# Patient Record
Sex: FEMALE | Race: BLACK OR AFRICAN AMERICAN | NOT HISPANIC OR LATINO | Employment: FULL TIME | URBAN - METROPOLITAN AREA
[De-identification: names, ages, dates, MRNs, and addresses within clinical notes are randomized per-mention and may not be internally consistent; named-entity substitution may affect disease eponyms.]

---

## 2017-01-03 ENCOUNTER — OFFICE VISIT (OUTPATIENT)
Dept: INTERNAL MEDICINE | Facility: CLINIC | Age: 27
End: 2017-01-03
Payer: COMMERCIAL

## 2017-01-03 VITALS
BODY MASS INDEX: 30.01 KG/M2 | HEIGHT: 62 IN | SYSTOLIC BLOOD PRESSURE: 110 MMHG | WEIGHT: 163.1 LBS | DIASTOLIC BLOOD PRESSURE: 66 MMHG | HEART RATE: 70 BPM | OXYGEN SATURATION: 98 % | TEMPERATURE: 97.9 F

## 2017-01-03 DIAGNOSIS — R22.0 FACIAL SWELLING: ICD-10-CM

## 2017-01-03 DIAGNOSIS — K59.00 CONSTIPATION, UNSPECIFIED CONSTIPATION TYPE: ICD-10-CM

## 2017-01-03 DIAGNOSIS — L65.9 HAIR LOSS: ICD-10-CM

## 2017-01-03 DIAGNOSIS — R53.83 FATIGUE, UNSPECIFIED TYPE: ICD-10-CM

## 2017-01-03 DIAGNOSIS — R68.89 COLD INTOLERANCE: ICD-10-CM

## 2017-01-03 DIAGNOSIS — R63.5 WEIGHT GAIN: ICD-10-CM

## 2017-01-03 LAB
ERYTHROCYTE [DISTWIDTH] IN BLOOD BY AUTOMATED COUNT: 15.2 % (ref 10–15)
FERRITIN SERPL-MCNC: 28 NG/ML (ref 12–150)
HCT VFR BLD AUTO: 35.6 % (ref 35–47)
HGB BLD-MCNC: 11.4 G/DL (ref 11.7–15.7)
IRON SATN MFR SERPL: 16 % (ref 15–46)
IRON SERPL-MCNC: 58 UG/DL (ref 35–180)
MCH RBC QN AUTO: 21.7 PG (ref 26.5–33)
MCHC RBC AUTO-ENTMCNC: 32 G/DL (ref 31.5–36.5)
MCV RBC AUTO: 68 FL (ref 78–100)
PLATELET # BLD AUTO: 237 10E9/L (ref 150–450)
RBC # BLD AUTO: 5.26 10E12/L (ref 3.8–5.2)
TIBC SERPL-MCNC: 361 UG/DL (ref 240–430)
TSH SERPL DL<=0.005 MIU/L-ACNC: 0.73 MU/L (ref 0.4–4)
WBC # BLD AUTO: 4.2 10E9/L (ref 4–11)

## 2017-01-03 PROCEDURE — 82306 VITAMIN D 25 HYDROXY: CPT | Mod: 90 | Performed by: INTERNAL MEDICINE

## 2017-01-03 PROCEDURE — 84443 ASSAY THYROID STIM HORMONE: CPT | Performed by: INTERNAL MEDICINE

## 2017-01-03 PROCEDURE — 99214 OFFICE O/P EST MOD 30 MIN: CPT | Performed by: INTERNAL MEDICINE

## 2017-01-03 PROCEDURE — 82728 ASSAY OF FERRITIN: CPT | Performed by: INTERNAL MEDICINE

## 2017-01-03 PROCEDURE — 83540 ASSAY OF IRON: CPT | Performed by: INTERNAL MEDICINE

## 2017-01-03 PROCEDURE — 85027 COMPLETE CBC AUTOMATED: CPT | Performed by: INTERNAL MEDICINE

## 2017-01-03 PROCEDURE — 36415 COLL VENOUS BLD VENIPUNCTURE: CPT | Performed by: INTERNAL MEDICINE

## 2017-01-03 PROCEDURE — 99000 SPECIMEN HANDLING OFFICE-LAB: CPT | Performed by: INTERNAL MEDICINE

## 2017-01-03 PROCEDURE — 83550 IRON BINDING TEST: CPT | Performed by: INTERNAL MEDICINE

## 2017-01-03 NOTE — PATIENT INSTRUCTIONS
Labs - please proceed to our first floor laboratory to have these drawn (show them your orange ticket).     Results:    If normal: we will release results in MyChart or send them in the mail. You will not be called for these results.    If abnormal, but non-urgent: we will release results in MyChart or send them in the mail. You will not be called for these results.    If abnormal and urgent: we will call you.

## 2017-01-03 NOTE — NURSING NOTE
"Chief Complaint   Patient presents with     Other     X 8-9 months. Hair loss, feeling puffy, weight gain, feeling cold and would like to get check for Hypothyroidism.     Anemia     Has been told to be anemic in the past.       Initial /66 mmHg  Pulse 70  Temp(Src) 97.9  F (36.6  C) (Oral)  Ht 5' 2\" (1.575 m)  Wt 163 lb 1.6 oz (73.982 kg)  BMI 29.82 kg/m2  SpO2 98%  LMP 12/29/2016 (Exact Date)  Breastfeeding? No Estimated body mass index is 29.82 kg/(m^2) as calculated from the following:    Height as of this encounter: 5' 2\" (1.575 m).    Weight as of this encounter: 163 lb 1.6 oz (73.982 kg).  BP completed using cuff size: regular    Kaminibose MA      "

## 2017-01-03 NOTE — PROGRESS NOTES
"  SUBJECTIVE:                                                      HPI: Gutierrez Mar is a pleasant 26 year old female who presents concern for thyroid disease:    - has had multiple symptoms ongoing for last year  - symptoms include:   - hair is thinning out - no receding hairline, widening part, or bald spots   - face is puffy   - much more fatigued than usual   - gets really cold often   - has gained 15-20lbs in last year   - extremely constipated - BM every 5 days - hard, small, and difficulty to evacuate   - both more anxious and feeling down than usual    - is very busy - nursing student  - only eats two larger than normal meals/day - admits that they are not the healthiest  - does not have time to exercise    Recently treated for breast implant infection.  Has been told that she was anemic in the past. Periods are generally regular (within week of expected date). Not on an iron supplement.    Otherwise, no active medical problems.   No personal or FH of thyroid disease.     The medication, allergy, and problem lists have been reviewed and updated as appropriate.     OBJECTIVE:                                                      /66 mmHg  Pulse 70  Temp(Src) 97.9  F (36.6  C) (Oral)  Ht 5' 2\" (1.575 m)  Wt 163 lb 1.6 oz (73.982 kg)  BMI 29.82 kg/m2  SpO2 98%  LMP 12/29/2016 (Exact Date)  Breastfeeding? No  Constitutional: well-appearing  Neck: supple, symmetric, no thyromegaly or lymphadenopathy  Respiratory: normal respiratory effort; clear to auscultation bilaterally  Cardiovascular: regular rate and rhythm; no edema  Gastrointestinal: soft, mildly tender throughout, non-distended, normal bowel sounds  Hair: no receding hairline or widening parts; negative pull test    ASSESSMENT/PLAN:                                                      (R53.83) Fatigue, unspecified type  Plan:    - will check TSH reflex, CBC, and iron studies.   - if above unrevealing, likely due to busy schedule, poor " diet, and lack of exercise.    (L65.9) Hair loss  Plan:    - will check TSH reflex, CBC, iron studies, and vitamin D level.    - if labs unrevealing, may benefit from seeing a dermatologist.     (R22.0) Facial swelling  Plan:    - will check TSH reflex (hypothyroidism --> weight gain --> facial puffiness).   - if negative, suspect that poor diet with likely high sodium content may be cause/contributing.     (K59.00) Constipation, unspecified constipation type  Plan:    - will check TSH reflex.   - if negative, likely due to poor diet.     (R63.5) Weight gain  Plan:    - will check TSH reflex.   - if negative, likely due to poor diet and lack of exercise.     (R68.89) Cold intolerance  Plan:    - will check TSH reflex, CBC, and iron studies.   - if negative, likely due to fatigue.     The instructions on the AVS were discussed and explained to the patient. Patient expressed understanding of instructions.  A total of 25 minutes were spent face-to-face with this patient during this encounter and over half of that time was spent on counseling and coordination of care re: above diagnoses and plans of care.     Bina Hunter MD   [address] [address]  T: [phone], F: [phone]              .

## 2017-01-03 NOTE — MR AVS SNAPSHOT
"              After Visit Summary   1/3/2017    Gutierrez Mar    MRN: [id number]           Patient Information     Date Of Birth          1990        Visit Information        Provider Department      1/3/2017 2:00 PM Bina Hunter MD [facility]        Today's Diagnoses     History of anemia    -  1     Screening for thyroid disorder         Fatigue, unspecified type         Hair loss           Care Instructions    Labs - please proceed to our first floor laboratory to have these drawn (show them your orange ticket).     Results:    If normal: we will release results in [employer] or send them in the mail. You will not be called for these results.    If abnormal, but non-urgent: we will release results in MyChart or send them in the mail. You will not be called for these results.    If abnormal and urgent: we will call you.                Follow-ups after your visit        Who to contact     If you have questions or need follow up information about today's clinic visit or your schedule please contact [facility] directly at [phone].  Normal or non-critical lab and imaging results will be communicated to you by MyChart, letter or phone within 4 business days after the clinic has received the results. If you do not hear from us within 7 days, please contact the clinic through [employer] or phone. If you have a critical or abnormal lab result, we will notify you by phone as soon as possible.  Submit refill requests through [employer] or call your pharmacy and they will forward the refill request to us. Please allow 3 business days for your refill to be completed.          Additional Information About Your Visit        [employer] Information     [employer] lets you send messages to your doctor, view your test results, renew your prescriptions, schedule appointments and more. To sign up, go to [email or web address] . Click on \"Log in\" on the left side of the screen, " "which will take you to the Welcome page. Then click on \"Sign up Now\" on the right side of the page.     You will be asked to enter the access code listed below, as well as some personal information. Please follow the directions to create your username and password.     Your access code is: [email or web address]  Expires: 4/3/2017  2:19 PM     Your access code will  in 90 days. If you need help or a new code, please call your [facility] or [phone].        Care EveryWhere ID     This is your Care EveryWhere ID. This could be used by other organizations to access your [facility] medical records  [id number]        Your Vitals Were     Pulse Temperature Height    70 97.9  F (36.6  C) (Oral) 5' 2\" (1.575 m)    BMI (Body Mass Index) Pulse Oximetry Last Period    29.82 kg/m2 98% 2016 (Exact Date)    Breastfeeding?          No         Blood Pressure from Last 3 Encounters:   17 110/66   16 108/70   16 117/66    Weight from Last 3 Encounters:   17 163 lb 1.6 oz (73.982 kg)   16 158 lb 4.8 oz (71.804 kg)   16 172 lb (78.019 kg)              We Performed the Following     CBC with platelets     Ferritin     Iron and iron binding capacity     TSH with free T4 reflex     Vitamin D deficiency screening        Primary Care Provider Office Phone # Fax #    [facility] [phone] [address] [facility] [address]        Thank you!     Thank you for choosing [facility]  for your care. Our goal is always to provide you with excellent care. Hearing back from our patients is one way we can continue to improve our services. Please take a few minutes to complete the written survey that you may receive in the mail after your visit with us. Thank you!             Your Updated Medication List - Protect others around you: Learn how to safely use, store and throw away your medicines at www.disposemymeds.org.    "   Notice  As of 1/3/2017  2:19 PM    You have not been prescribed any medications.

## 2017-01-04 LAB — DEPRECATED CALCIDIOL+CALCIFEROL SERPL-MC: 21 UG/L (ref 20–75)

## 2017-04-11 ENCOUNTER — OFFICE VISIT (OUTPATIENT)
Dept: INTERNAL MEDICINE | Facility: CLINIC | Age: 27
End: 2017-04-11
Payer: COMMERCIAL

## 2017-04-11 VITALS
TEMPERATURE: 98.2 F | SYSTOLIC BLOOD PRESSURE: 108 MMHG | OXYGEN SATURATION: 100 % | HEART RATE: 66 BPM | BODY MASS INDEX: 29.71 KG/M2 | WEIGHT: 167.7 LBS | HEIGHT: 63 IN | DIASTOLIC BLOOD PRESSURE: 68 MMHG

## 2017-04-11 DIAGNOSIS — E88.810 METABOLIC SYNDROME X: ICD-10-CM

## 2017-04-11 DIAGNOSIS — R79.1 PT PROLONGED: ICD-10-CM

## 2017-04-11 DIAGNOSIS — Z13.6 CARDIOVASCULAR SCREENING; LDL GOAL LESS THAN 160: ICD-10-CM

## 2017-04-11 DIAGNOSIS — R53.83 FATIGUE, UNSPECIFIED TYPE: ICD-10-CM

## 2017-04-11 DIAGNOSIS — E63.9 POOR DIET: ICD-10-CM

## 2017-04-11 DIAGNOSIS — R07.89 CHEST WALL PAIN: Primary | ICD-10-CM

## 2017-04-11 DIAGNOSIS — D57.3 SICKLE CELL TRAIT (H): ICD-10-CM

## 2017-04-11 DIAGNOSIS — D64.9 ANEMIA, UNSPECIFIED TYPE: ICD-10-CM

## 2017-04-11 DIAGNOSIS — E55.9 VITAMIN D DEFICIENCY: ICD-10-CM

## 2017-04-11 LAB
ERYTHROCYTE [DISTWIDTH] IN BLOOD BY AUTOMATED COUNT: 15.1 % (ref 10–15)
HCT VFR BLD AUTO: 36.2 % (ref 35–47)
HGB BLD-MCNC: 11.6 G/DL (ref 11.7–15.7)
INR PPP: 1.04 (ref 0.86–1.14)
MCH RBC QN AUTO: 22.1 PG (ref 26.5–33)
MCHC RBC AUTO-ENTMCNC: 32 G/DL (ref 31.5–36.5)
MCV RBC AUTO: 69 FL (ref 78–100)
PLATELET # BLD AUTO: 251 10E9/L (ref 150–450)
RBC # BLD AUTO: 5.24 10E12/L (ref 3.8–5.2)
WBC # BLD AUTO: 4.8 10E9/L (ref 4–11)

## 2017-04-11 PROCEDURE — 84207 ASSAY OF VITAMIN B-6: CPT | Mod: 90 | Performed by: INTERNAL MEDICINE

## 2017-04-11 PROCEDURE — 99215 OFFICE O/P EST HI 40 MIN: CPT | Performed by: INTERNAL MEDICINE

## 2017-04-11 PROCEDURE — 83550 IRON BINDING TEST: CPT | Performed by: INTERNAL MEDICINE

## 2017-04-11 PROCEDURE — 84425 ASSAY OF VITAMIN B-1: CPT | Mod: 90 | Performed by: INTERNAL MEDICINE

## 2017-04-11 PROCEDURE — 82180 ASSAY OF ASCORBIC ACID: CPT | Mod: 90 | Performed by: INTERNAL MEDICINE

## 2017-04-11 PROCEDURE — 83540 ASSAY OF IRON: CPT | Performed by: INTERNAL MEDICINE

## 2017-04-11 PROCEDURE — 85027 COMPLETE CBC AUTOMATED: CPT | Performed by: INTERNAL MEDICINE

## 2017-04-11 PROCEDURE — 85610 PROTHROMBIN TIME: CPT | Performed by: INTERNAL MEDICINE

## 2017-04-11 PROCEDURE — 84443 ASSAY THYROID STIM HORMONE: CPT | Performed by: INTERNAL MEDICINE

## 2017-04-11 PROCEDURE — 99000 SPECIMEN HANDLING OFFICE-LAB: CPT | Performed by: INTERNAL MEDICINE

## 2017-04-11 PROCEDURE — 82306 VITAMIN D 25 HYDROXY: CPT | Performed by: INTERNAL MEDICINE

## 2017-04-11 PROCEDURE — 82607 VITAMIN B-12: CPT | Performed by: INTERNAL MEDICINE

## 2017-04-11 PROCEDURE — 84597 ASSAY OF VITAMIN K: CPT | Mod: 90 | Performed by: INTERNAL MEDICINE

## 2017-04-11 PROCEDURE — 83721 ASSAY OF BLOOD LIPOPROTEIN: CPT | Performed by: INTERNAL MEDICINE

## 2017-04-11 PROCEDURE — 84439 ASSAY OF FREE THYROXINE: CPT | Performed by: INTERNAL MEDICINE

## 2017-04-11 PROCEDURE — 36415 COLL VENOUS BLD VENIPUNCTURE: CPT | Performed by: INTERNAL MEDICINE

## 2017-04-11 PROCEDURE — 80053 COMPREHEN METABOLIC PANEL: CPT | Performed by: INTERNAL MEDICINE

## 2017-04-11 ASSESSMENT — ANXIETY QUESTIONNAIRES
5. BEING SO RESTLESS THAT IT IS HARD TO SIT STILL: SEVERAL DAYS
2. NOT BEING ABLE TO STOP OR CONTROL WORRYING: MORE THAN HALF THE DAYS
GAD7 TOTAL SCORE: 15
3. WORRYING TOO MUCH ABOUT DIFFERENT THINGS: NEARLY EVERY DAY
IF YOU CHECKED OFF ANY PROBLEMS ON THIS QUESTIONNAIRE, HOW DIFFICULT HAVE THESE PROBLEMS MADE IT FOR YOU TO DO YOUR WORK, TAKE CARE OF THINGS AT HOME, OR GET ALONG WITH OTHER PEOPLE: VERY DIFFICULT
7. FEELING AFRAID AS IF SOMETHING AWFUL MIGHT HAPPEN: MORE THAN HALF THE DAYS
1. FEELING NERVOUS, ANXIOUS, OR ON EDGE: MORE THAN HALF THE DAYS
6. BECOMING EASILY ANNOYED OR IRRITABLE: NEARLY EVERY DAY

## 2017-04-11 ASSESSMENT — PATIENT HEALTH QUESTIONNAIRE - PHQ9: 5. POOR APPETITE OR OVEREATING: MORE THAN HALF THE DAYS

## 2017-04-11 NOTE — Clinical Note
Please abstract the following data from this visit with this patient into the appropriate field in Epic:  Pap smear done on this date: 5/20/16 (approximately), by this group: [facility] , results were negative per careeverywhere .

## 2017-04-11 NOTE — PATIENT INSTRUCTIONS
** FOLLOW UP PLAN**:    PLEASE SCHEDULE OFFICE VISIT SOONEST AVAILABILITY FROM TODAY TO FOLLOW UP ON  VITAMIN D, TEST RESULTS FROM TODAY'S VISIT AND TO REVIEW CURRENT SYMPTOMS   REFERRALS TODAY  YOU MAY CONTACT THE CLINIC IF ANY QUESTIONS OR CONCERNS -[phone] OR VIA [employer]

## 2017-04-11 NOTE — NURSING NOTE
"Chief Complaint   Patient presents with     Establish Care     Chest Pain       Initial /68  Pulse 66  Temp 98.2  F (36.8  C) (Oral)  Ht 5' 2.5\" (1.588 m)  Wt 167 lb 11.2 oz (76.1 kg)  LMP 04/11/2017  SpO2 100%  BMI 30.18 kg/m2 Estimated body mass index is 30.18 kg/(m^2) as calculated from the following:    Height as of this encounter: 5' 2.5\" (1.588 m).    Weight as of this encounter: 167 lb 11.2 oz (76.1 kg).  Medication Reconciliation: complete   ERIKA Wolf      "

## 2017-04-11 NOTE — PROGRESS NOTES
SUBJECTIVE:                                                    Gutierrez Mar is a 26 year old female who presents to clinic today for the following health issues:      CHEST PAIN     Onset: 10 years     Description:   Location:  left side  Character: sharp  Radiation: on left side  Duration: 5 minutes     Intensity: severe    Progression of Symptoms:  worsening    Accompanying Signs & Symptoms:  Shortness of breath: no  Sweating: no  Nausea/vomiting: no  Lightheadedness: YES  Palpitations: YES  Fever/Chills: YES- chills   Cough: no  Heartburn: no    History:   Family history of heart disease no  Tobacco use: no    Precipitating factors:   Worse with exertion: YES/ unable to move during episode   Worse with deep breaths :  no  Related to food: no    Alleviating factors:         Therapies Tried and outcome: none      Gutierrez has had issues with moderate to severe fatigue. This non-specific and is not associated with fevers, chills, night sweats, weight loss, chest pain, SOB or a focal source for her symptoms.  Gutierrez  also denies recent history of anemia, thyroid instability or depression.  There has been no evidence of medication interaction or substance abuse.  This has been ongoing for many years, and workup so far has been inconclusive.    Of note Gutierrez has a history of Vitamin D diagnosed in 2014 and is at risk for other Vitamin and or mineral deficiencies.     Gutierrez was never given a prescription for supplements and admits that her dietary habits are not the best.      Problem list and histories reviewed & adjusted, as indicated.  Additional history: as documented    Labs reviewed in EPIC everywhere. Of note curiously, her PT is prolonged at 15.2. She reports that she was seen by hematologist and was told that it was a normal variant.  Per her report vitamin K level was also checked and was normal.  There is no record of her being checked for autoimmune causes of prolonged PT.    Reviewed and  "updated as needed this visit by clinical staff  Tobacco  Allergies       Reviewed and updated as needed this visit by Provider         ROS:  14 point ROS negative except as above      OBJECTIVE:                                                    /68  Pulse 66  Temp 98.2  F (36.8  C) (Oral)  Ht 5' 2.5\" (1.588 m)  Wt 167 lb 11.2 oz (76.1 kg)  LMP 04/11/2017  SpO2 100%  BMI 30.18 kg/m2  Body mass index is 30.18 kg/(m^2).  GENERAL: healthy, alert and no distress  NECK: no adenopathy, no asymmetry, masses, or scars and thyroid normal to palpation  RESP: lungs clear to auscultation - no rales, rhonchi or wheezes  BREAST: normal without masses, tenderness or nipple discharge, no palpable axillary masses or adenopathy and well healed biopsy incision bilateral from mammoplasty  CV: regular rate and rhythm, normal S1 S2, no S3 or S4, no murmur, click or rub, no peripheral edema and peripheral pulses strong  ABDOMEN: soft, nontender, no hepatosplenomegaly, no masses and bowel sounds normal  MS: no gross musculoskeletal defects noted, no edema    Diagnostic Test Results:  Results for orders placed or performed in visit on 01/03/17   TSH with free T4 reflex   Result Value Ref Range    TSH 0.73 0.40 - 4.00 mU/L   CBC with platelets   Result Value Ref Range    WBC 4.2 4.0 - 11.0 10e9/L    RBC Count 5.26 (H) 3.8 - 5.2 10e12/L    Hemoglobin 11.4 (L) 11.7 - 15.7 g/dL    Hematocrit 35.6 35.0 - 47.0 %    MCV 68 (L) 78 - 100 fl    MCH 21.7 (L) 26.5 - 33.0 pg    MCHC 32.0 31.5 - 36.5 g/dL    RDW 15.2 (H) 10.0 - 15.0 %    Platelet Count 237 150 - 450 10e9/L   Iron and iron binding capacity   Result Value Ref Range    Iron 58 35 - 180 ug/dL    Iron Binding Cap 361 240 - 430 ug/dL    Iron Saturation Index 16 15 - 46 %   Ferritin   Result Value Ref Range    Ferritin 28 12 - 150 ng/mL   Vitamin D deficiency screening   Result Value Ref Range    Vitamin D Deficiency screening 21 20 - 75 ug/L        ASSESSMENT/PLAN:               "                                        DIAGNOSIS/PLAN:     ICD-10-CM    1. Chest wall pain R07.89 TSH     T4 FREE    LIKELY RELATED TO VITAMIN D DEFICIENCY   2. PT prolonged R79.1 INR     Vitamin K   3. Vitamin D deficiency E55.9 Vitamin D Deficiency     cholecalciferol (VITAMIN D3) 23694 UNITS capsule     Calcium Carb-Cholecalciferol (CALCIUM 1000 + D) 1000-800 MG-UNIT TABS   4. Fatigue, unspecified type R53.83 Vitamin B12     Vitamin C     Vitamin D Deficiency     TSH     T4 FREE     Vitamin B1 whole blood     Iron and iron binding capacity     Comprehensive metabolic panel     CBC with platelets     Vitamin B6     cholecalciferol (VITAMIN D3) 52908 UNITS capsule     Calcium Carb-Cholecalciferol (CALCIUM 1000 + D) 1000-800 MG-UNIT TABS   5. Poor diet E63.9 Vitamin B12     Vitamin C     Vitamin B1 whole blood     CBC with platelets     Vitamin B6   6. Anemia, unspecified type D64.9 Iron and iron binding capacity     CBC with platelets   7. CARDIOVASCULAR SCREENING; LDL GOAL LESS THAN 160 Z13.6 LDL cholesterol direct   8. Metabolic syndrome X E88.81        SIGNIFICANT ISSUES RE The primary encounter diagnosis was Chest wall pain. Diagnoses of PT prolonged, Vitamin D deficiency, Fatigue, unspecified type, Poor diet, Anemia, unspecified type, CARDIOVASCULAR SCREENING; LDL GOAL LESS THAN 160, and Metabolic syndrome X were also pertinent to this visit. AS NOTED AND ADDRESSED ABOVE   MEDS AND AND LABS AS ORDERED TO ADDRESS PREVIOUS AND CURRENT ABNORMAL INDICES  Gutierrez is presenting with chest wall pain today which could certainly be caused by vitamin D deficiency as her previous labs show moderate to severe deficiency. She also had some previous abnormal labs as noted above including abnormal PT and anemia.  She will have labs drawn today as noted and will return to the clinic to review those test results but in the meantime will be started on vitamin D replacement. She has been told that she may take Tylenol or  ibuprofen for the pain and to refrain from heavy lifting but she will probably experience improvement of her symptoms with starting vitamin D supplementation.    SEE PATIENT INSTRUCTION SECTION FOR FOLLOW UP PLAN    Gutierrez IS TO CONTINUE OTHER TREATMENT REGIMEN/PLANS EXCEPT AS INDICATED    COMPLIANCE WITH MEDICATIONS DIET AND EXERCISE PLANS ENCOURAGED    DISCONTINUED MEDS:  There are no discontinued medications.    CURRENT MED LIST WITH CHANGES AS NOTED BELOW:  Current Outpatient Prescriptions   Medication Sig Dispense Refill     cholecalciferol (VITAMIN D3) 68327 UNITS capsule 1 CAP 2 X A WK FOR 2.5 WKS, THEN 1 CAP 1ST AND 15TH OF EACH MONTH, FOR VIT D DEFICIENCY 40 capsule 0     Calcium Carb-Cholecalciferol (CALCIUM 1000 + D) 1000-800 MG-UNIT TABS Take 1 tablet by mouth daily TAKE WITH FOOD, FOR BONE HEALTH AND FOR VITAMIN D SUPPLEMENTATION 100 tablet PRN         Office visit time > 40 mins, greater than 50% of which was spent obtaining history, reviewing medications, counseling re compliance with treatment plan, discussion of treatment, follow up plans, and coordination of care.          Patient Instructions   ** FOLLOW UP PLAN**:    PLEASE SCHEDULE OFFICE VISIT SOONEST AVAILABILITY FROM TODAY TO FOLLOW UP ON  VITAMIN D, TEST RESULTS FROM TODAY'S VISIT AND TO REVIEW CURRENT SYMPTOMS   REFERRALS TODAY  YOU MAY CONTACT THE CLINIC IF ANY QUESTIONS OR CONCERNS -[phone] OR VIA [employer]ARMIN Tenorio MD  [facility]

## 2017-04-11 NOTE — MR AVS SNAPSHOT
After Visit Summary   4/11/2017    Gutierrez Mar    MRN: [id number]           Patient Information     Date Of Birth          1990        Visit Information        Provider Department      4/11/2017 3:00 PM Kendall Tenorio MD [facility]        Today's Diagnoses     Chest wall pain    -  1    Sickle cell trait (H)        PT prolonged        Vitamin D deficiency        Fatigue, unspecified type        Poor diet        Anemia, unspecified type        CARDIOVASCULAR SCREENING; LDL GOAL LESS THAN 160        Metabolic syndrome X          Care Instructions    ** FOLLOW UP PLAN**:    PLEASE SCHEDULE OFFICE VISIT SOONEST AVAILABILITY FROM TODAY TO FOLLOW UP ON  VITAMIN D, TEST RESULTS FROM TODAY'S VISIT AND TO REVIEW CURRENT SYMPTOMS   REFERRALS TODAY  YOU MAY CONTACT THE CLINIC IF ANY QUESTIONS OR CONCERNS -[phone] OR VIA [employer]                   Follow-ups after your visit        Who to contact     If you have questions or need follow up information about today's clinic visit or your schedule please contact [facility] directly at [phone].  Normal or non-critical lab and imaging results will be communicated to you by [employer], letter or phone within 4 business days after the clinic has received the results. If you do not hear from us within 7 days, please contact the clinic through [employer] or phone. If you have a critical or abnormal lab result, we will notify you by phone as soon as possible.  Submit refill requests through [employer] or call your pharmacy and they will forward the refill request to us. Please allow 3 business days for your refill to be completed.          Additional Information About Your Visit        [employer] Information     [employer] gives you secure access to your electronic health record. If you see a primary care provider, you can also send messages to your care team and make appointments. If you have questions, please  "call your primary care clinic.  If you do not have a primary care provider, please call [phone] and they will assist you.        Care EveryWhere ID     This is your Care EveryWhere ID. This could be used by other organizations to access your [facility] medical records  [id number]        Your Vitals Were     Pulse Temperature Height Last Period Pulse Oximetry BMI (Body Mass Index)    66 98.2  F (36.8  C) (Oral) 5' 2.5\" (1.588 m) 04/11/2017 100% 30.18 kg/m2       Blood Pressure from Last 3 Encounters:   04/11/17 108/68   01/03/17 110/66   11/07/16 108/70    Weight from Last 3 Encounters:   04/11/17 167 lb 11.2 oz (76.1 kg)   01/03/17 163 lb 1.6 oz (74 kg)   11/07/16 158 lb 4.8 oz (71.8 kg)              We Performed the Following     CBC with platelets     Comprehensive metabolic panel     INR     Iron and iron binding capacity     LDL cholesterol direct     T4 FREE     TSH     Vitamin B1 whole blood     Vitamin B12     Vitamin B6     Vitamin C     Vitamin D Deficiency     Vitamin K          Today's Medication Changes          These changes are accurate as of: 4/11/17  4:27 PM.  If you have any questions, ask your nurse or doctor.               Start taking these medicines.        Dose/Directions    Calcium Carb-Cholecalciferol 1000-800 MG-UNIT Tabs   Commonly known as:  CALCIUM 1000 + D   Used for:  Vitamin D deficiency, Fatigue, unspecified type   Started by:  Kendall Tenorio MD        Dose:  1 tablet   Take 1 tablet by mouth daily TAKE WITH FOOD, FOR BONE HEALTH AND FOR VITAMIN D SUPPLEMENTATION   Quantity:  100 tablet   Refills:  PRN       cholecalciferol 09476 UNITS capsule   Commonly known as:  VITAMIN D3   Used for:  Vitamin D deficiency, Fatigue, unspecified type   Started by:  Kendall Tenorio MD        1 CAP 2 X A WK FOR 2.5 WKS, THEN 1 CAP 1ST AND 15TH OF EACH MONTH, FOR VIT D DEFICIENCY   Quantity:  40 capsule   Refills:  0            Where to get your medicines      These medications were sent " to [place], [place] - [address]  [address] [facility] [address]     Phone:  [phone]     cholecalciferol 12453 UNITS capsule         Some of these will need a paper prescription and others can be bought over the counter.  Ask your nurse if you have questions.     Bring a paper prescription for each of these medications     Calcium Carb-Cholecalciferol 1000-800 MG-UNIT Tabs                Primary Care Provider Office Phone # Fax #    [facility] [phone] [address] [facility] [address]        Thank you!     Thank you for choosing [facility]  for your care. Our goal is always to provide you with excellent care. Hearing back from our patients is one way we can continue to improve our services. Please take a few minutes to complete the written survey that you may receive in the mail after your visit with us. Thank you!             Your Updated Medication List - Protect others around you: Learn how to safely use, store and throw away your medicines at www.disposemymeds.org.          This list is accurate as of: 4/11/17  4:27 PM.  Always use your most recent med list.                   Brand Name Dispense Instructions for use    Calcium Carb-Cholecalciferol 1000-800 MG-UNIT Tabs    CALCIUM 1000 + D    100 tablet    Take 1 tablet by mouth daily TAKE WITH FOOD, FOR BONE HEALTH AND FOR VITAMIN D SUPPLEMENTATION       cholecalciferol 63653 UNITS capsule    VITAMIN D3    40 capsule    1 CAP 2 X A WK FOR 2.5 WKS, THEN 1 CAP 1ST AND 15TH OF EACH MONTH, FOR VIT D DEFICIENCY

## 2017-04-12 LAB
ALBUMIN SERPL-MCNC: 4 G/DL (ref 3.4–5)
ALP SERPL-CCNC: 51 U/L (ref 40–150)
ALT SERPL W P-5'-P-CCNC: 20 U/L (ref 0–50)
ANION GAP SERPL CALCULATED.3IONS-SCNC: 10 MMOL/L (ref 3–14)
AST SERPL W P-5'-P-CCNC: 15 U/L (ref 0–45)
BILIRUB SERPL-MCNC: 0.3 MG/DL (ref 0.2–1.3)
BUN SERPL-MCNC: 12 MG/DL (ref 7–30)
CALCIUM SERPL-MCNC: 8.9 MG/DL (ref 8.5–10.1)
CHLORIDE SERPL-SCNC: 107 MMOL/L (ref 94–109)
CO2 SERPL-SCNC: 23 MMOL/L (ref 20–32)
CREAT SERPL-MCNC: 0.8 MG/DL (ref 0.52–1.04)
DEPRECATED CALCIDIOL+CALCIFEROL SERPL-MC: 8 UG/L (ref 20–75)
GFR SERPL CREATININE-BSD FRML MDRD: 87 ML/MIN/1.7M2
GLUCOSE SERPL-MCNC: 78 MG/DL (ref 70–99)
IRON SATN MFR SERPL: 26 % (ref 15–46)
IRON SERPL-MCNC: 91 UG/DL (ref 35–180)
LDLC SERPL DIRECT ASSAY-MCNC: 107 MG/DL
POTASSIUM SERPL-SCNC: 3.9 MMOL/L (ref 3.4–5.3)
PROT SERPL-MCNC: 7.8 G/DL (ref 6.8–8.8)
SODIUM SERPL-SCNC: 140 MMOL/L (ref 133–144)
T4 FREE SERPL-MCNC: 0.99 NG/DL (ref 0.76–1.46)
TIBC SERPL-MCNC: 352 UG/DL (ref 240–430)
TSH SERPL DL<=0.05 MIU/L-ACNC: 0.63 MU/L (ref 0.4–4)
VIT B12 SERPL-MCNC: 777 PG/ML (ref 193–986)

## 2017-04-12 ASSESSMENT — ANXIETY QUESTIONNAIRES: GAD7 TOTAL SCORE: 15

## 2017-04-12 ASSESSMENT — PATIENT HEALTH QUESTIONNAIRE - PHQ9: SUM OF ALL RESPONSES TO PHQ QUESTIONS 1-9: 14

## 2017-04-14 LAB
PHYTONADIONE SERPL-MCNC: 1.94 NG/L
VIT B1 BLD-MCNC: 69 UG/DL
VIT B6 SERPL-MCNC: 309.6 NG/ML
VIT C SERPL-MCNC: 47 MG/DL

## 2017-06-28 ENCOUNTER — OFFICE VISIT (OUTPATIENT)
Dept: URGENT CARE | Facility: URGENT CARE | Age: 27
End: 2017-06-28
Payer: MEDICAID

## 2017-06-28 VITALS
WEIGHT: 165 LBS | DIASTOLIC BLOOD PRESSURE: 76 MMHG | SYSTOLIC BLOOD PRESSURE: 120 MMHG | HEART RATE: 83 BPM | BODY MASS INDEX: 29.7 KG/M2 | TEMPERATURE: 98.2 F | OXYGEN SATURATION: 99 %

## 2017-06-28 DIAGNOSIS — R07.0 THROAT PAIN: Primary | ICD-10-CM

## 2017-06-28 DIAGNOSIS — J03.01 ACUTE RECURRENT STREPTOCOCCAL TONSILLITIS: ICD-10-CM

## 2017-06-28 LAB
DEPRECATED S PYO AG THROAT QL EIA: ABNORMAL
MICRO REPORT STATUS: ABNORMAL
SPECIMEN SOURCE: ABNORMAL

## 2017-06-28 PROCEDURE — 99214 OFFICE O/P EST MOD 30 MIN: CPT | Performed by: PHYSICIAN ASSISTANT

## 2017-06-28 PROCEDURE — 87880 STREP A ASSAY W/OPTIC: CPT | Performed by: PHYSICIAN ASSISTANT

## 2017-06-28 RX ORDER — CLINDAMYCIN HCL 300 MG
300 CAPSULE ORAL 3 TIMES DAILY
Qty: 30 CAPSULE | Refills: 0 | Status: SHIPPED | OUTPATIENT
Start: 2017-06-28 | End: 2018-05-17

## 2017-06-28 NOTE — MR AVS SNAPSHOT
After Visit Summary   6/28/2017    Gutierrez Mar    MRN: [id number]           Patient Information     Date Of Birth          1990        Visit Information        Provider Department      6/28/2017 2:15 PM Dana Vazquez PA-C [facility]        Today's Diagnoses     Throat pain    -  1    Acute recurrent streptococcal tonsillitis           Follow-ups after your visit        Who to contact     If you have questions or need follow up information about today's clinic visit or your schedule please contact [facility] directly at [phone].  Normal or non-critical lab and imaging results will be communicated to you by [employer], letter or phone within 4 business days after the clinic has received the results. If you do not hear from us within 7 days, please contact the clinic through [employer] or phone. If you have a critical or abnormal lab result, we will notify you by phone as soon as possible.  Submit refill requests through [employer] or call your pharmacy and they will forward the refill request to us. Please allow 3 business days for your refill to be completed.          Additional Information About Your Visit        MyChart Information     [employer] gives you secure access to your electronic health record. If you see a primary care provider, you can also send messages to your care team and make appointments. If you have questions, please call your primary care clinic.  If you do not have a primary care provider, please call [phone] and they will assist you.        Care EveryWhere ID     This is your Care EveryWhere ID. This could be used by other organizations to access your [place] medical records  [id number]        Your Vitals Were     Pulse Temperature Pulse Oximetry BMI (Body Mass Index)          83 98.2  F (36.8  C) (Oral) 99% 29.7 kg/m2         Blood Pressure from Last 3 Encounters:   06/28/17 120/76   04/11/17  108/68   01/03/17 110/66    Weight from Last 3 Encounters:   06/28/17 165 lb (74.8 kg)   04/11/17 167 lb 11.2 oz (76.1 kg)   01/03/17 163 lb 1.6 oz (74 kg)              We Performed the Following     Strep, Rapid Screen          Today's Medication Changes          These changes are accurate as of: 6/28/17  2:48 PM.  If you have any questions, ask your nurse or doctor.               Start taking these medicines.        Dose/Directions    clindamycin 300 MG capsule   Commonly known as:  CLEOCIN   Used for:  Acute recurrent streptococcal tonsillitis   Started by:  Dana Vazquez PA-C        Dose:  300 mg   Take 1 capsule (300 mg) by mouth 3 times daily   Quantity:  30 capsule   Refills:  0            Where to get your medicines      These medications were sent to [address] [address]     Phone:  [phone]     clindamycin 300 MG capsule                Primary Care Provider Office Phone # Fax #    [facility] [phone] [address] [facility] [address]        Equal Access to Services     CAROL GAYLE AH: Hadii margareth smith hadasho Soomaali, waaxda luqadaha, qaybta kaalmada adeegyada, porfirio bowen. So [facility] [phone].    ATENCIÓN: Si habla español, tiene a conley disposición servicios gratuitos de asistencia lingüística. Llame al [phone].    We comply with applicable federal civil rights laws and [place] laws. We do not discriminate on the basis of race, color, national origin, age, disability sex, sexual orientation or gender identity.            Thank you!     Thank you for choosing [facility]  for your care. Our goal is always to provide you with excellent care. Hearing back from our patients is one way we can continue to improve our services. Please take a few minutes to complete the written survey that you may  receive in the mail after your visit with us. Thank you!             Your Updated Medication List - Protect others around you: Learn how to safely use, store and throw away your medicines at www.disposemymeds.org.          This list is accurate as of: 6/28/17  2:48 PM.  Always use your most recent med list.                   Brand Name Dispense Instructions for use Diagnosis    Calcium Carb-Cholecalciferol 1000-800 MG-UNIT Tabs    CALCIUM 1000 + D    100 tablet    Take 1 tablet by mouth daily TAKE WITH FOOD, FOR BONE HEALTH AND FOR VITAMIN D SUPPLEMENTATION    Vitamin D deficiency, Fatigue, unspecified type       cholecalciferol 15119 UNITS capsule    VITAMIN D3    40 capsule    1 CAP 2 X A WK FOR 2.5 WKS, THEN 1 CAP 1ST AND 15TH OF EACH MONTH, FOR VIT D DEFICIENCY    Vitamin D deficiency, Fatigue, unspecified type       clindamycin 300 MG capsule    CLEOCIN    30 capsule    Take 1 capsule (300 mg) by mouth 3 times daily    Acute recurrent streptococcal tonsillitis

## 2017-06-28 NOTE — PROGRESS NOTES
SUBJECTIVE:   Gutierrez Mar is a 26 year old female presenting with a chief complaint of   1) sore throat for the past 4 days  2) fevers.  Onset of symptoms was 4 day(s) ago.  Course of illness is worsening.    Severity moderate  Current and Associated symptoms: as above  Treatment measures tried include OTC meds.  Predisposing factors include None.  Having surgery in the next couple of weeks.      Past Medical History:   Diagnosis Date     C. difficile diarrhea      Sickle cell trait (H)      Patient Active Problem List   Diagnosis     Dizzy     PT prolonged     Vitamin D deficiency     Fatigue, unspecified type     Poor diet     Anemia, unspecified type     Chest wall pain     Metabolic syndrome X     Sickle cell trait (H)     CARDIOVASCULAR SCREENING; LDL GOAL LESS THAN 160     Social History   Substance Use Topics     Smoking status: Never Smoker     Smokeless tobacco: Never Used     Alcohol use No       ROS:  CONSTITUTIONAL:as per HPI  INTEGUMENTARY/SKIN: NEGATIVE for worrisome rashes, moles or lesions  EYES: NEGATIVE for vision changes or irritation  ENT/MOUTH: as per HPI  RESP:NEGATIVE for significant cough or SOB  CV: NEGATIVE for chest pain, palpitations or peripheral edema  GI: NEGATIVE for nausea, abdominal pain, heartburn, or change in bowel habits  MUSCULOSKELETAL: NEGATIVE for significant arthralgias or myalgia    OBJECTIVE  :/76 (BP Location: Right leg, Patient Position: Chair, Cuff Size: Adult Regular)  Pulse 83  Temp 98.2  F (36.8  C) (Oral)  Wt 165 lb (74.8 kg)  SpO2 99%  BMI 29.7 kg/m2  GENERAL APPEARANCE: healthy, alert and no distress  EYES: EOMI,  PERRL, conjunctiva clear  HENT: ear canals and TM's normal.  Nose without ulcers, erythema or lesions  HENT: tonsillar hypertrophy, tonsillar erythema and tonsillar exudate  NECK: supple, with  bilateral anterior cervical adenopathy  RESP: lungs clear to auscultation - no rales, rhonchi or wheezes  CV: regular rates and rhythm, normal  S1 S2, no murmur noted  ABDOMEN:  soft, nontender, no HSM or masses and bowel sounds normal  NEURO: Normal strength and tone, sensory exam grossly normal,  normal speech and mentation  SKIN: no suspicious lesions or rashes    (R07.0) Throat pain  (primary encounter diagnosis)  Comment:   Plan: Strep, Rapid Screen            (J03.01) Acute recurrent streptococcal tonsillitis  Comment:   Plan: clindamycin (CLEOCIN) 300 MG capsule        Salt water gargles.    Considered contagious for 24 hours.    F/u with PCP should symptoms persist or worsen.    Toss toothbrush after finishing antibiotic.   Tylenol prn.

## 2017-06-28 NOTE — NURSING NOTE
"Chief Complaint   Patient presents with     Pharyngitis     st for 4 days       Initial /76 (BP Location: Right leg, Patient Position: Chair, Cuff Size: Adult Regular)  Pulse 83  Temp 98.2  F (36.8  C) (Oral)  Wt 165 lb (74.8 kg)  SpO2 99%  BMI 29.7 kg/m2 Estimated body mass index is 29.7 kg/(m^2) as calculated from the following:    Height as of 4/11/17: 5' 2.5\" (1.588 m).    Weight as of this encounter: 165 lb (74.8 kg).  Medication Reconciliation: complete   Raciel CLEMENT    "

## 2017-07-17 ENCOUNTER — HOSPITAL ENCOUNTER (EMERGENCY)
Facility: CLINIC | Age: 27
Discharge: HOME | End: 2017-07-18
Attending: EMERGENCY MEDICINE | Admitting: EMERGENCY MEDICINE
Payer: COMMERCIAL

## 2017-07-17 VITALS
TEMPERATURE: 98.3 F | OXYGEN SATURATION: 100 % | DIASTOLIC BLOOD PRESSURE: 68 MMHG | RESPIRATION RATE: 18 BRPM | WEIGHT: 175.2 LBS | SYSTOLIC BLOOD PRESSURE: 160 MMHG | BODY MASS INDEX: 31.53 KG/M2

## 2017-07-17 DIAGNOSIS — Z51.89 VISIT FOR WOUND CHECK: ICD-10-CM

## 2017-07-17 PROCEDURE — 99282 EMERGENCY DEPT VISIT SF MDM: CPT

## 2017-07-17 RX ORDER — OXYCODONE AND ACETAMINOPHEN 5; 325 MG/1; MG/1
1-2 TABLET ORAL EVERY 4 HOURS PRN
COMMUNITY
End: 2017-12-11

## 2017-07-17 NOTE — ED AVS SNAPSHOT
Emergency Department    [address] [place] [address]    Phone:  [phone]    Fax:  [phone]                                       Gutierrez Mar   MRN: [id number]    Department:   Emergency Department   Date of Visit:  7/17/2017           Patient Information     Date Of Birth          1990        Your diagnoses for this visit were:     Visit for wound check        You were seen by Lawrence Rader MD.      Follow-up Information     Please follow up.    Why:  follow up this week, ideally tomorrow, for wound check        Discharge Instructions         Wound Care    You have a break in the skin. This wound may be because of an injury. Or it may be the result of surgery. Closing the wound helps stop bleeding, protects the wound from infection, and speeds healing. The type of closure that is used depends on the size and location of the wound. Choices include stitches (sutures), strips of surgical tape, skin glue, or staples.  Home care  Your healthcare provider may prescribe medicines for pain. Or he or she may suggest an over-the-counter (OTC) pain reliever, such as ibuprofen. If you have chronic kidney disease, talk with your provider before taking any OTC medicines. Also talk with your provider if you've had a stomach ulcer or gastrointestinal bleeding. In certain cases, antibiotics may be prescribed to help prevent infection. If antibiotics are prescribed, take them exactly as directed for as long as directed. Do not stop taking your antibiotics until they are all gone, even if you feel better.  General care    Follow the healthcare provider s instructions on how to care for the wound.    Wash your hands with soap and warm water before and after caring for the wound. This helps prevent infection.    If a bandage was applied, change it once a day or as directed. If at any time the bandage becomes wet or dirty, replace it with a new one.    Unless told otherwise, avoid soaking  the wound in water. Take showers or sponge baths instead of tub baths. Don't scrub or pick at the wound.    Don't go swimming.    If you have a bandage and it gets wet, use a clean cloth to gently pat the wound dry. Then replace the bandage with a dry one.    Don't scratch, rub, or pick at the area.    Watch for the signs of infection listed below. Any wound can get infected, even if you are taking antibiotics. Seek care right away if you see any possible signs of infection.  Care for specific closures    Sutures. You may want to clean the wound daily after the first 2 to 3 days. To do this, remove the bandage and gently wash the area with soap and warm water. After cleaning, apply a thin layer of antibiotic ointment if recommended. Then apply a new bandage. Sutures on the outside of the skin usually need to be removed by your healthcare provider.    Surgical tape. Keep the area dry. If it gets wet, blot it dry with a towel. Surgical tape closures usually fall off within 7 to 10 days. If they have not fallen off after 10 days, you can remove them yourself. To remove the tape, use mineral oil or petroleum jelly on a cotton ball to gently rub the adhesive.    Skin glue. You may shower or bathe as usual, but do not use soaps, lotions, or ointments on the wound area. Do not scrub the wound. After bathing, pat the wound dry with a soft towel. Do not apply liquids like peroxide, ointments, or creams to the wound while the strips or film is in place. Don't scratch, rub, or pick at the strips or film. Don't put tape directly over the strips or film. Skin adhesive film will fall off naturally in 5 to 10 days. If it does not peel off in 10 days, gently rub petroleum jelly or an ointment onto the film.    [place]. Take showers or sponge baths. Don't take tub baths. Don't use lotions on the wound area. The area may be cleaned with soap and water 2 to 3 days after the wound was stapled. Don't scrub the wound. Pat it dry with a  clean soft cloth or towel. You can use antibiotic ointment if your provider tells you to. Staples will need to be removed by your healthcare provider in 10 to 14 days.  Follow-up care  Follow up with your healthcare provider, or as directed. If you have sutures or staples, return for their removal as directed.  When to seek medical advice  Call your healthcare provider right away if you have signs of infection:    Fever of 100.4 F (38 C) or higher, or as directed by your healthcare provider    Increasing pain in the wound    Increasing redness or swelling    Pus or bad-smelling drainage from the wound  Also call your provider right away if any of these occur:    Wound bleeds more than a small amount or won t stop bleeding    Wound edges come apart    Numbness or weakness in the wound area that doesn t go away  Date Last Reviewed: 1/7/2016 2000-2017 [employer]. [address] [place] [address]. All rights reserved. This information is not intended as a substitute for professional medical care. Always follow your healthcare professional's instructions.          24 Hour Appointment Hotline       To make an appointment at any [facility], call [phone] ([phone]). If you don't have a family doctor or clinic, we will help you find one. [facility] clinics are conveniently located to serve the needs of you and your family.             Review of your medicines      Our records show that you are taking the medicines listed below. If these are incorrect, please call your family doctor or clinic.        Dose / Directions Last dose taken    Calcium Carb-Cholecalciferol 1000-800 MG-UNIT Tabs   Commonly known as:  CALCIUM 1000 + D   Dose:  1 tablet   Quantity:  100 tablet        Take 1 tablet by mouth daily TAKE WITH FOOD, FOR BONE HEALTH AND FOR VITAMIN D SUPPLEMENTATION   Refills:  PRN        cholecalciferol 05086 UNITS capsule   Commonly known as:  VITAMIN D3   Quantity:  40 capsule         1 CAP 2 X A WK FOR 2.5 WKS, THEN 1 CAP 1ST AND 15TH OF EACH MONTH, FOR VIT D DEFICIENCY   Refills:  0        CLARITHROMYCIN PO   Dose:  250 mg        Take 250 mg by mouth 2 times daily   Refills:  0        clindamycin 300 MG capsule   Commonly known as:  CLEOCIN   Dose:  300 mg   Quantity:  30 capsule        Take 1 capsule (300 mg) by mouth 3 times daily   Refills:  0        COLACE PO   Dose:  100 mg        Take 100 mg by mouth daily   Refills:  0        GABAPENTIN PO   Dose:  300 mg        Take 300 mg by mouth 3 times daily   Refills:  0        LEVAQUIN PO   Dose:  750 mg        Take 750 mg by mouth daily   Refills:  0        NAPROXEN PO   Dose:  250 mg        Take 250 mg by mouth 2 times daily (with meals)   Refills:  0        oxyCODONE-acetaminophen 5-325 MG per tablet   Commonly known as:  PERCOCET   Dose:  1-2 tablet        Take 1-2 tablets by mouth every 4 hours as needed for moderate to severe pain   Refills:  0                Orders Needing Specimen Collection     None      Pending Results     No orders found for last 3 day(s).            Pending Culture Results     No orders found for last 3 day(s).            Pending Results Instructions     If you had any lab results that were not finalized at the time of your Discharge, you can call the ED Lab Result RN at [phone]. You will be contacted by this team for any positive Lab results or changes in treatment. The nurses are available 7 days a week from 10A to 6:30P.  You can leave a message 24 hours per day and they will return your call.        Test Results From Your Hospital Stay               Clinical Quality Measure: Blood Pressure Screening     Your blood pressure was checked while you were in the emergency department today. The last reading we obtained was  BP: 160/68 . Please read the guidelines below about what these numbers mean and what you should do about them.  If your systolic blood pressure (the top number) is less than 120 and your  diastolic blood pressure (the bottom number) is less than 80, then your blood pressure is normal. There is nothing more that you need to do about it.  If your systolic blood pressure (the top number) is 120-139 or your diastolic blood pressure (the bottom number) is 80-89, your blood pressure may be higher than it should be. You should have your blood pressure rechecked within a year by a primary care provider.  If your systolic blood pressure (the top number) is 140 or greater or your diastolic blood pressure (the bottom number) is 90 or greater, you may have high blood pressure. High blood pressure is treatable, but if left untreated over time it can put you at risk for heart attack, stroke, or kidney failure. You should have your blood pressure rechecked by a primary care provider within the next 4 weeks.  If your provider in the emergency department today gave you specific instructions to follow-up with your doctor or provider even sooner than that, you should follow that instruction and not wait for up to 4 weeks for your follow-up visit.        Thank you for choosing [facility]       Thank you for choosing [facility] for your care. Our goal is always to provide you with excellent care. Hearing back from our patients is one way we can continue to improve our services. Please take a few minutes to complete the written survey that you may receive in the mail after you visit with us. Thank you!        [employer] Information     [employer] gives you secure access to your electronic health record. If you see a primary care provider, you can also send messages to your care team and make appointments. If you have questions, please call your primary care clinic.  If you do not have a primary care provider, please call [phone] and they will assist you.        Care EveryWhere ID     This is your Care EveryWhere ID. This could be used by other organizations to access your [place] medical records  [id number]        Equal  Access to Services     CAROL [facility] HospitalEMILIANO : Naida Brown, patsy liang, qaporfirio dial. So [facility] [phone].    ATENCIÓN: Si habla español, tiene a conley disposición servicios gratuitos de asistencia lingüística. Llame al [phone].    We comply with applicable federal civil rights laws and [place] laws. We do not discriminate on the basis of race, color, national origin, age, disability sex, sexual orientation or gender identity.            After Visit Summary       This is your record. Keep this with you and show to your community pharmacist(s) and doctor(s) at your next visit.

## 2017-07-17 NOTE — ED AVS SNAPSHOT
Emergency Department    [address] [place] [address]    Phone:  [phone]    Fax:  [phone]                                       Gutierrez Mar   MRN: [id number]    Department:   Emergency Department   Date of Visit:  7/17/2017           After Visit Summary Signature Page     I have received my discharge instructions, and my questions have been answered. I have discussed any challenges I see with this plan with the nurse or doctor.    ..........................................................................................................................................  Patient/Patient Representative Signature      ..........................................................................................................................................  Patient Representative Print Name and Relationship to Patient    ..................................................               ................................................  Date                                            Time    ..........................................................................................................................................  Reviewed by Signature/Title    ...................................................              ..............................................  Date                                                            Time

## 2017-07-18 NOTE — ED NOTES
Pt had buttocks implants  ON 7/12/17 in [place].  Tonight pt had drainage from between buttocks tonight happened about .  Drainage looked reddish/fatty colored like drainage from J.P.

## 2017-07-18 NOTE — DISCHARGE INSTRUCTIONS
Wound Care    You have a break in the skin. This wound may be because of an injury. Or it may be the result of surgery. Closing the wound helps stop bleeding, protects the wound from infection, and speeds healing. The type of closure that is used depends on the size and location of the wound. Choices include stitches (sutures), strips of surgical tape, skin glue, or staples.  Home care  Your healthcare provider may prescribe medicines for pain. Or he or she may suggest an over-the-counter (OTC) pain reliever, such as ibuprofen. If you have chronic kidney disease, talk with your provider before taking any OTC medicines. Also talk with your provider if you've had a stomach ulcer or gastrointestinal bleeding. In certain cases, antibiotics may be prescribed to help prevent infection. If antibiotics are prescribed, take them exactly as directed for as long as directed. Do not stop taking your antibiotics until they are all gone, even if you feel better.  General care    Follow the healthcare provider s instructions on how to care for the wound.    Wash your hands with soap and warm water before and after caring for the wound. This helps prevent infection.    If a bandage was applied, change it once a day or as directed. If at any time the bandage becomes wet or dirty, replace it with a new one.    Unless told otherwise, avoid soaking the wound in water. Take showers or sponge baths instead of tub baths. Don't scrub or pick at the wound.    Don't go swimming.    If you have a bandage and it gets wet, use a clean cloth to gently pat the wound dry. Then replace the bandage with a dry one.    Don't scratch, rub, or pick at the area.    Watch for the signs of infection listed below. Any wound can get infected, even if you are taking antibiotics. Seek care right away if you see any possible signs of infection.  Care for specific closures    Sutures. You may want to clean the wound daily after the first 2 to 3 days. To do  this, remove the bandage and gently wash the area with soap and warm water. After cleaning, apply a thin layer of antibiotic ointment if recommended. Then apply a new bandage. Sutures on the outside of the skin usually need to be removed by your healthcare provider.    Surgical tape. Keep the area dry. If it gets wet, blot it dry with a towel. Surgical tape closures usually fall off within 7 to 10 days. If they have not fallen off after 10 days, you can remove them yourself. To remove the tape, use mineral oil or petroleum jelly on a cotton ball to gently rub the adhesive.    Skin glue. You may shower or bathe as usual, but do not use soaps, lotions, or ointments on the wound area. Do not scrub the wound. After bathing, pat the wound dry with a soft towel. Do not apply liquids like peroxide, ointments, or creams to the wound while the strips or film is in place. Don't scratch, rub, or pick at the strips or film. Don't put tape directly over the strips or film. Skin adhesive film will fall off naturally in 5 to 10 days. If it does not peel off in 10 days, gently rub petroleum jelly or an ointment onto the film.    [place]. Take showers or sponge baths. Don't take tub baths. Don't use lotions on the wound area. The area may be cleaned with soap and water 2 to 3 days after the wound was stapled. Don't scrub the wound. Pat it dry with a clean soft cloth or towel. You can use antibiotic ointment if your provider tells you to. Staples will need to be removed by your healthcare provider in 10 to 14 days.  Follow-up care  Follow up with your healthcare provider, or as directed. If you have sutures or staples, return for their removal as directed.  When to seek medical advice  Call your healthcare provider right away if you have signs of infection:    Fever of 100.4 F (38 C) or higher, or as directed by your healthcare provider    Increasing pain in the wound    Increasing redness or swelling    Pus or bad-smelling drainage  from the wound  Also call your provider right away if any of these occur:    Wound bleeds more than a small amount or won t stop bleeding    Wound edges come apart    Numbness or weakness in the wound area that doesn t go away  Date Last Reviewed: 1/7/2016 2000-2017 [employer]. [address] [place] [address]. All rights reserved. This information is not intended as a substitute for professional medical care. Always follow your healthcare professional's instructions.

## 2017-07-18 NOTE — ED PROVIDER NOTES
History     Chief Complaint:  Post-op Problem    HPI   Gutierrez Mar is a 26 year old female who presents with a post operative issue. Five days ago, the patient traveled to [place], [place], and had buttox implants placed by Dr Kayla Bernard of [facility]. Since then, the patient has had faint red, yellow drainage from her plastic drains. She denies any puss like drainage. Over time, the patient has had decreased left sided fluid drainage, but consistent right sided fluid drainage. Yesterday, the drains were slightly pulling, however, she plans to have the drains in more a few more days. The patient plans to follow up with a colleague of her surgeon, Dr Shashank Frost from [facility], tomorrow. The patient states she has pain medication that she has been taking and is not complaining of pain here. She is most concerned for infection as last fall the patient had an infection with her breast implants. She has been trying to monitor her incision area putting on antibiotic ointment with gauze on it.     Allergies:  Keflex, GI disturbance  Oxycodone, nausea     Medications:    Levofloxacin   Gabapentin   Oxycodone-acetaminophen   Naproxen  Clarithromycin   Docusate sodium  Clindamycin    Past Medical History:    PT prolonged  Anemia  Metabolic syndrome  Sickle cell trait  C difficile diarrhea    Past Surgical History:    Buttox implants  Breast implants    Family History:    No past pertinent family history.    Social History:  Negative for tobacco use.  Negative for alcohol use.  Marital Status:  Single [1]    Review of Systems   Constitutional:        Positive for concern for infection   Musculoskeletal:        Negative for pain   All other systems reviewed and are negative.      Physical Exam   First Vitals:  BP: 160/68  Heart Rate: 119  Temp: 98.3  F (36.8  C)  Resp: 18  Weight: 79.5 kg (175 lb 3.2 oz)  SpO2: 100 %      Physical Exam  SKIN:  Intact, non-inflamed longitudinal  incisions of the right and left gluteal cleft.  No area of induration or fluctuance.  Bilateral NAHID drains with serosanguinous drainage in the tubes and bulbs.  CARDIOVASCULAR:  Regular rate and rhythm.  RESPIRATORY:  No respiratory distress.  NEUROLOGIC:  Alert, conversant.  PSYCHIATRIC:  Normal mood.    Emergency Department Course   Emergency Department Course:  Nursing notes and vitals reviewed. I performed an exam of the patient as documented above.     The above workup was undertaken.    Findings and plan explained to the Patient. Patient discharged home with instructions regarding supportive care, medications, and reasons to return. The importance of close follow-up was reviewed.      Impression & Plan    Medical Decision Making:  Gutierrez Mar is a 26 year old female, who was concerned about a post operative wound. She is systemically not ill with fever, chills, or body aches. The incisions along the gluteal cleft are intact, and non inflamed. NO induration or fluctuance about the incisions. Wounds are draining expected serosanguinous fluid. At this point, she does not seem to be suffering from a post operative infection. Advise follow up this week with the local physician she was referred to by her plastic surgeon that treated her in [place], [place].     Diagnosis:    ICD-10-CM    1. Visit for wound check Z51.89        Disposition:  discharged to home    Naya BUI, am serving as a scribe on 7/17/2017 at 11:51 PM to personally document services performed by Lawrence Rader MD based on my observations and the provider's statements to me.     Naya Mcmullen  7/17/2017    EMERGENCY DEPARTMENT       Lawrence Rader MD  07/20/17 0966

## 2017-09-10 ENCOUNTER — HEALTH MAINTENANCE LETTER (OUTPATIENT)
Age: 27
End: 2017-09-10

## 2017-10-21 ENCOUNTER — OFFICE VISIT (OUTPATIENT)
Dept: URGENT CARE | Facility: URGENT CARE | Age: 27
End: 2017-10-21
Payer: COMMERCIAL

## 2017-10-21 VITALS
DIASTOLIC BLOOD PRESSURE: 76 MMHG | HEART RATE: 79 BPM | OXYGEN SATURATION: 97 % | RESPIRATION RATE: 16 BRPM | SYSTOLIC BLOOD PRESSURE: 130 MMHG | TEMPERATURE: 97.8 F | BODY MASS INDEX: 29.16 KG/M2 | WEIGHT: 162 LBS

## 2017-10-21 DIAGNOSIS — B02.9 HERPES ZOSTER WITHOUT COMPLICATION: Primary | ICD-10-CM

## 2017-10-21 PROCEDURE — 99213 OFFICE O/P EST LOW 20 MIN: CPT | Performed by: FAMILY MEDICINE

## 2017-10-21 RX ORDER — HYDROCODONE BITARTRATE AND ACETAMINOPHEN 5; 325 MG/1; MG/1
1 TABLET ORAL EVERY 6 HOURS PRN
Qty: 8 TABLET | Refills: 0 | Status: SHIPPED | OUTPATIENT
Start: 2017-10-21 | End: 2017-10-23

## 2017-10-21 RX ORDER — VALACYCLOVIR HYDROCHLORIDE 1 G/1
1000 TABLET, FILM COATED ORAL 3 TIMES DAILY
Qty: 21 TABLET | Refills: 0 | Status: SHIPPED | OUTPATIENT
Start: 2017-10-21 | End: 2018-05-17

## 2017-10-21 NOTE — MR AVS SNAPSHOT
After Visit Summary   10/21/2017    Gutierrez Mar    MRN: [id number]           Patient Information     Date Of Birth          1990        Visit Information        Provider Department      10/21/2017 11:55 AM Stevan Purvis,  [facility]        Today's Diagnoses     Herpes zoster without complication    -  1       Follow-ups after your visit        Who to contact     If you have questions or need follow up information about today's clinic visit or your schedule please contact [facility] directly at [phone].  Normal or non-critical lab and imaging results will be communicated to you by [employer], letter or phone within 4 business days after the clinic has received the results. If you do not hear from us within 7 days, please contact the clinic through [employer] or phone. If you have a critical or abnormal lab result, we will notify you by phone as soon as possible.  Submit refill requests through [facility] or call your pharmacy and they will forward the refill request to us. Please allow 3 business days for your refill to be completed.          Additional Information About Your Visit        MyChart Information     [facility] gives you secure access to your electronic health record. If you see a primary care provider, you can also send messages to your care team and make appointments. If you have questions, please call your primary care clinic.  If you do not have a primary care provider, please call [phone] and they will assist you.        Care EveryWhere ID     This is your Care EveryWhere ID. This could be used by other organizations to access your [place] medical records  [id number]        Your Vitals Were     Pulse Temperature Respirations Pulse Oximetry BMI (Body Mass Index)       79 97.8  F (36.6  C) (Tympanic) 16 97% 29.16 kg/m2        Blood Pressure from Last 3 Encounters:   10/21/17 130/76   07/17/17 160/68   06/28/17  120/76    Weight from Last 3 Encounters:   10/21/17 162 lb (73.5 kg)   07/17/17 175 lb 3.2 oz (79.5 kg)   06/28/17 165 lb (74.8 kg)              Today, you had the following     No orders found for display         Today's Medication Changes          These changes are accurate as of: 10/21/17 12:29 PM.  If you have any questions, ask your nurse or doctor.               Start taking these medicines.        Dose/Directions    HYDROcodone-acetaminophen 5-325 MG per tablet   Commonly known as:  NORCO   Used for:  Herpes zoster without complication   Started by:  Stevan Purvis DO        Dose:  1 tablet   Take 1 tablet by mouth every 6 hours as needed   Quantity:  8 tablet   Refills:  0       valACYclovir 1000 mg tablet   Commonly known as:  VALTREX   Used for:  Herpes zoster without complication   Started by:  Stevan Purvis DO        Dose:  1000 mg   Take 1 tablet (1,000 mg) by mouth 3 times daily for 7 days   Quantity:  21 tablet   Refills:  0            Where to get your medicines      These medications were sent to [address] [address]     Phone:  [phone]     valACYclovir 1000 mg tablet         Some of these will need a paper prescription and others can be bought over the counter.  Ask your nurse if you have questions.     Bring a paper prescription for each of these medications     HYDROcodone-acetaminophen 5-325 MG per tablet                Primary Care Provider Office Phone # Fax #    [facility] [phone] [address] [facility] [address]        Equal Access to Services     CAROL GAYLE AH: Hadii aad ku hadasho Sogianaali, waaxda luqadaha, qaybta kaalmada adeegyada, porfirio bowen. So [facility] [phone].    ATENCIÓN: Si habla español, tiene a conley disposición servicios gratuitos de asistencia lingüística. Llame al [phone].    We comply  with applicable federal civil rights laws and [place] laws. We do not discriminate on the basis of race, color, national origin, age, disability, sex, sexual orientation, or gender identity.            Thank you!     Thank you for choosing [facility]  for your care. Our goal is always to provide you with excellent care. Hearing back from our patients is one way we can continue to improve our services. Please take a few minutes to complete the written survey that you may receive in the mail after your visit with us. Thank you!             Your Updated Medication List - Protect others around you: Learn how to safely use, store and throw away your medicines at www.disposemymeds.org.          This list is accurate as of: 10/21/17 12:29 PM.  Always use your most recent med list.                   Brand Name Dispense Instructions for use Diagnosis    Calcium Carb-Cholecalciferol 1000-800 MG-UNIT Tabs    CALCIUM 1000 + D    100 tablet    Take 1 tablet by mouth daily TAKE WITH FOOD, FOR BONE HEALTH AND FOR VITAMIN D SUPPLEMENTATION    Vitamin D deficiency, Fatigue, unspecified type       cholecalciferol 12714 UNITS capsule    VITAMIN D3    40 capsule    1 CAP 2 X A WK FOR 2.5 WKS, THEN 1 CAP 1ST AND 15TH OF EACH MONTH, FOR VIT D DEFICIENCY    Vitamin D deficiency, Fatigue, unspecified type       CLARITHROMYCIN PO      Take 250 mg by mouth 2 times daily        clindamycin 300 MG capsule    CLEOCIN    30 capsule    Take 1 capsule (300 mg) by mouth 3 times daily    Acute recurrent streptococcal tonsillitis       COLACE PO      Take 100 mg by mouth daily        GABAPENTIN PO      Take 300 mg by mouth 3 times daily        HYDROcodone-acetaminophen 5-325 MG per tablet    NORCO    8 tablet    Take 1 tablet by mouth every 6 hours as needed    Herpes zoster without complication       LEVAQUIN PO      Take 750 mg by mouth daily        NAPROXEN PO      Take 250 mg by mouth 2 times daily (with meals)         oxyCODONE-acetaminophen 5-325 MG per tablet    PERCOCET     Take 1-2 tablets by mouth every 4 hours as needed for moderate to severe pain        valACYclovir 1000 mg tablet    VALTREX    21 tablet    Take 1 tablet (1,000 mg) by mouth 3 times daily for 7 days    Herpes zoster without complication

## 2017-10-21 NOTE — PROGRESS NOTES
SUBJECTIVE:Gutierrez Mar is a 27 year old female who presents to the clinic today for a rash.  Onset of rash was 1 day(s) ago.   Rash is still present.   Location of the rash: scalp.  Associated symptoms include: painful with blisters on left side of scalp only.    Symptoms are moderate and rash seems to be worsening.  Therapies tried to improve the rash: OTC.  Previous history of a similar rash? No  Recent exposure history: none known    Past Medical History:   Diagnosis Date     C. difficile diarrhea      Sickle cell trait (H)      Allergies   Allergen Reactions     Keflet [Cephalexin] GI Disturbance and Diarrhea     Leading to C-diff     Oxycodone Nausea     Percocet ok. Just straight Oxycodone makes her nauseated.      Social History   Substance Use Topics     Smoking status: Never Smoker     Smokeless tobacco: Never Used     Alcohol use No       ROS:CONSTITUTIONAL:NEGATIVE for fever, chills, change in weight  EYES: NEGATIVE for vision changes or irritation    EXAM: VITALS: /76 (BP Location: Left arm, Patient Position: Chair, Cuff Size: Adult Regular)  Pulse 79  Temp 97.8  F (36.6  C) (Tympanic)  Resp 16  Wt 162 lb (73.5 kg)  SpO2 97%  BMI 29.16 kg/m2  General:healthy,alert,no distress    Location: scalp     Distribution: localized     Lesion grouping: single patch and unilateral and dermatomal    Lesion type: macular and vesicular     Color: red with tendernessPERTINENT EXAM: GENERAL APPEARANCE: healthy, alert and no distress  EYES: EOMI,  PERRL, conjunctiva clear  NECK: supple, non-tender to palpation, no adenopathy noted  RESP: lungs clear to auscultation - no rales, rhonchi or wheezes      ICD-10-CM    1. Herpes zoster without complication B02.9 valACYclovir (VALTREX) 1000 mg tablet     HYDROcodone-acetaminophen (NORCO) 5-325 MG per tablet       Follow-up with primary clinic if not improving

## 2017-10-21 NOTE — NURSING NOTE
"Chief Complaint   Patient presents with     Headache     Derm Problem     forehead       Initial /76 (BP Location: Left arm, Patient Position: Chair, Cuff Size: Adult Regular)  Pulse 79  Temp 97.8  F (36.6  C) (Tympanic)  Resp 16  Wt 162 lb (73.5 kg)  SpO2 97%  BMI 29.16 kg/m2 Estimated body mass index is 29.16 kg/(m^2) as calculated from the following:    Height as of 4/11/17: 5' 2.5\" (1.588 m).    Weight as of this encounter: 162 lb (73.5 kg).  Medication Reconciliation: complete     Princess TABATHA Villafuerte CMA      "

## 2017-12-11 ENCOUNTER — APPOINTMENT (OUTPATIENT)
Dept: ULTRASOUND IMAGING | Facility: CLINIC | Age: 27
End: 2017-12-11
Attending: INTERNAL MEDICINE
Payer: COMMERCIAL

## 2017-12-11 ENCOUNTER — HOSPITAL ENCOUNTER (EMERGENCY)
Facility: CLINIC | Age: 27
Discharge: HOME | End: 2017-12-11
Attending: INTERNAL MEDICINE | Admitting: INTERNAL MEDICINE
Payer: COMMERCIAL

## 2017-12-11 ENCOUNTER — OFFICE VISIT (OUTPATIENT)
Dept: URGENT CARE | Facility: URGENT CARE | Age: 27
End: 2017-12-11
Payer: COMMERCIAL

## 2017-12-11 VITALS
DIASTOLIC BLOOD PRESSURE: 80 MMHG | WEIGHT: 175 LBS | HEART RATE: 73 BPM | BODY MASS INDEX: 31.5 KG/M2 | RESPIRATION RATE: 20 BRPM | SYSTOLIC BLOOD PRESSURE: 130 MMHG | TEMPERATURE: 98 F

## 2017-12-11 VITALS
RESPIRATION RATE: 18 BRPM | TEMPERATURE: 96.9 F | DIASTOLIC BLOOD PRESSURE: 77 MMHG | OXYGEN SATURATION: 100 % | HEART RATE: 60 BPM | SYSTOLIC BLOOD PRESSURE: 116 MMHG

## 2017-12-11 DIAGNOSIS — O20.0 THREATENED MISCARRIAGE: ICD-10-CM

## 2017-12-11 DIAGNOSIS — N93.9 VAGINAL BLEEDING: Primary | ICD-10-CM

## 2017-12-11 LAB
B-HCG SERPL-ACNC: 433 IU/L (ref 0–5)
BASOPHILS # BLD AUTO: 0 10E9/L (ref 0–0.2)
BASOPHILS NFR BLD AUTO: 0.5 %
DIFFERENTIAL METHOD BLD: ABNORMAL
EOSINOPHIL # BLD AUTO: 0.1 10E9/L (ref 0–0.7)
EOSINOPHIL NFR BLD AUTO: 1 %
ERYTHROCYTE [DISTWIDTH] IN BLOOD BY AUTOMATED COUNT: 15.9 % (ref 10–15)
HCT VFR BLD AUTO: 40.2 % (ref 35–47)
HGB BLD-MCNC: 12.6 G/DL (ref 11.7–15.7)
IMM GRANULOCYTES # BLD: 0 10E9/L (ref 0–0.4)
IMM GRANULOCYTES NFR BLD: 0.3 %
LYMPHOCYTES # BLD AUTO: 2.7 10E9/L (ref 0.8–5.3)
LYMPHOCYTES NFR BLD AUTO: 45 %
MCH RBC QN AUTO: 21.8 PG (ref 26.5–33)
MCHC RBC AUTO-ENTMCNC: 31.3 G/DL (ref 31.5–36.5)
MCV RBC AUTO: 70 FL (ref 78–100)
MONOCYTES # BLD AUTO: 0.5 10E9/L (ref 0–1.3)
MONOCYTES NFR BLD AUTO: 7.9 %
NEUTROPHILS # BLD AUTO: 2.7 10E9/L (ref 1.6–8.3)
NEUTROPHILS NFR BLD AUTO: 45.3 %
NRBC # BLD AUTO: 0 10*3/UL
NRBC BLD AUTO-RTO: 0 /100
PLATELET # BLD AUTO: 279 10E9/L (ref 150–450)
RBC # BLD AUTO: 5.78 10E12/L (ref 3.8–5.2)
WBC # BLD AUTO: 5.9 10E9/L (ref 4–11)

## 2017-12-11 PROCEDURE — 76801 OB US < 14 WKS SINGLE FETUS: CPT

## 2017-12-11 PROCEDURE — 84702 CHORIONIC GONADOTROPIN TEST: CPT | Performed by: INTERNAL MEDICINE

## 2017-12-11 PROCEDURE — 96374 THER/PROPH/DIAG INJ IV PUSH: CPT

## 2017-12-11 PROCEDURE — 25000128 H RX IP 250 OP 636: Performed by: INTERNAL MEDICINE

## 2017-12-11 PROCEDURE — 85025 COMPLETE CBC W/AUTO DIFF WBC: CPT | Performed by: INTERNAL MEDICINE

## 2017-12-11 PROCEDURE — 99285 EMERGENCY DEPT VISIT HI MDM: CPT | Mod: 25

## 2017-12-11 PROCEDURE — 96375 TX/PRO/DX INJ NEW DRUG ADDON: CPT

## 2017-12-11 RX ORDER — METOCLOPRAMIDE HYDROCHLORIDE 5 MG/ML
5 INJECTION INTRAMUSCULAR; INTRAVENOUS ONCE
Status: COMPLETED | OUTPATIENT
Start: 2017-12-11 | End: 2017-12-11

## 2017-12-11 RX ORDER — HYDROCODONE BITARTRATE AND ACETAMINOPHEN 5; 325 MG/1; MG/1
1-2 TABLET ORAL EVERY 4 HOURS PRN
Qty: 15 TABLET | Refills: 0 | Status: SHIPPED | OUTPATIENT
Start: 2017-12-11 | End: 2018-01-25

## 2017-12-11 RX ORDER — MORPHINE SULFATE 4 MG/ML
4 INJECTION, SOLUTION INTRAMUSCULAR; INTRAVENOUS ONCE
Status: COMPLETED | OUTPATIENT
Start: 2017-12-11 | End: 2017-12-11

## 2017-12-11 RX ORDER — METOCLOPRAMIDE 10 MG/1
10 TABLET ORAL 4 TIMES DAILY PRN
Qty: 20 TABLET | Refills: 0 | Status: SHIPPED | OUTPATIENT
Start: 2017-12-11 | End: 2018-01-25

## 2017-12-11 RX ORDER — ONDANSETRON 2 MG/ML
4 INJECTION INTRAMUSCULAR; INTRAVENOUS ONCE
Status: DISCONTINUED | OUTPATIENT
Start: 2017-12-11 | End: 2017-12-11

## 2017-12-11 RX ADMIN — METOCLOPRAMIDE 5 MG: 5 INJECTION, SOLUTION INTRAMUSCULAR; INTRAVENOUS at 21:30

## 2017-12-11 RX ADMIN — MORPHINE SULFATE 4 MG: 4 INJECTION, SOLUTION INTRAMUSCULAR; INTRAVENOUS at 21:33

## 2017-12-11 ASSESSMENT — ENCOUNTER SYMPTOMS: ABDOMINAL PAIN: 0

## 2017-12-11 NOTE — MR AVS SNAPSHOT
After Visit Summary   12/11/2017    Gutiererz Mar    MRN: [id number]           Patient Information     Date Of Birth          1990        Visit Information        Provider Department      12/11/2017 7:50 PM Provider, Varsha Nielson MD [facility]        Today's Diagnoses     Vaginal bleeding    -  1       Follow-ups after your visit        Who to contact     If you have questions or need follow up information about today's clinic visit or your schedule please contact [facility] directly at [phone].  Normal or non-critical lab and imaging results will be communicated to you by [employer], letter or phone within 4 business days after the clinic has received the results. If you do not hear from us within 7 days, please contact the clinic through [employer] or phone. If you have a critical or abnormal lab result, we will notify you by phone as soon as possible.  Submit refill requests through [email or web address] or call your pharmacy and they will forward the refill request to us. Please allow 3 business days for your refill to be completed.          Additional Information About Your Visit        MyChart Information     [email or web address] gives you secure access to your electronic health record. If you see a primary care provider, you can also send messages to your care team and make appointments. If you have questions, please call your primary care clinic.  If you do not have a primary care provider, please call [phone] and they will assist you.        Care EveryWhere ID     This is your Care EveryWhere ID. This could be used by other organizations to access your [place] medical records  [id number]        Your Vitals Were     Pulse Temperature Respirations Last Period BMI (Body Mass Index)       73 98  F (36.7  C) (Oral) 20 10/23/2017 31.5 kg/m2        Blood Pressure from Last 3 Encounters:   12/11/17 130/80   10/21/17 130/76   07/17/17 160/68    Weight  from Last 3 Encounters:   12/11/17 175 lb (79.4 kg)   10/21/17 162 lb (73.5 kg)   07/17/17 175 lb 3.2 oz (79.5 kg)              Today, you had the following     No orders found for display       Primary Care Provider Office Phone # Fax # [facility] [phone] [address] [facility] [address]        Equal Access to Services     CAROL GAYLE : Hadii aad ku hadasho Soomaali, waaxda luqadaha, qaybta kaalmada adeegyada, waxay idiin hayaan adeeg kharash la'johnn ah. So [facility] [phone].    ATENCIÓN: Si habla español, tiene a conley disposición servicios gratuitos de asistencia lingüística. Llame al [phone].    We comply with applicable federal civil rights laws and [place] laws. We do not discriminate on the basis of race, color, national origin, age, disability, sex, sexual orientation, or gender identity.            Thank you!     Thank you for choosing [facility]  for your care. Our goal is always to provide you with excellent care. Hearing back from our patients is one way we can continue to improve our services. Please take a few minutes to complete the written survey that you may receive in the mail after your visit with us. Thank you!             Your Updated Medication List - Protect others around you: Learn how to safely use, store and throw away your medicines at www.disposemymeds.org.          This list is accurate as of: 12/11/17  8:19 PM.  Always use your most recent med list.                   Brand Name Dispense Instructions for use Diagnosis    Calcium Carb-Cholecalciferol 1000-800 MG-UNIT Tabs    CALCIUM 1000 + D    100 tablet    Take 1 tablet by mouth daily TAKE WITH FOOD, FOR BONE HEALTH AND FOR VITAMIN D SUPPLEMENTATION    Vitamin D deficiency, Fatigue, unspecified type       cholecalciferol 39369 UNITS capsule    VITAMIN D3    40 capsule    1 CAP 2 X A WK FOR 2.5 WKS, THEN 1 CAP 1ST AND 15TH OF EACH MONTH, FOR  VIT D DEFICIENCY    Vitamin D deficiency, Fatigue, unspecified type       CLARITHROMYCIN PO      Take 250 mg by mouth 2 times daily        clindamycin 300 MG capsule    CLEOCIN    30 capsule    Take 1 capsule (300 mg) by mouth 3 times daily    Acute recurrent streptococcal tonsillitis       COLACE PO      Take 100 mg by mouth daily        GABAPENTIN PO      Take 300 mg by mouth 3 times daily        LEVAQUIN PO      Take 750 mg by mouth daily        NAPROXEN PO      Take 250 mg by mouth 2 times daily (with meals)        oxyCODONE-acetaminophen 5-325 MG per tablet    PERCOCET     Take 1-2 tablets by mouth every 4 hours as needed for moderate to severe pain        valACYclovir 1000 mg tablet    VALTREX    21 tablet    Take 1 tablet (1,000 mg) by mouth 3 times daily for 7 days    Herpes zoster without complication

## 2017-12-11 NOTE — ED AVS SNAPSHOT
[facility]    [address] [place] [address]    Phone:  [phone]    Fax:  [phone]                                       Gutierrez Mar   MRN: [id number]    Department:  [facility]   Date of Visit:  12/11/2017           Patient Information     Date Of Birth          1990        Your diagnoses for this visit were:     Threatened miscarriage        You were seen by Barbara Woodward MD.      Follow-up Information     Follow up with Clinic, [facility] In 2 days.    Why:  follow-up in 2-3 days for repeat quantitative pregnancy test and re-check    Contact information:    [address] [facility] [address] [phone]          Discharge Instructions       Discharge Instructions  Vaginal Bleeding in Pregnancy    Bleeding in early pregnancy can be a sign of a miscarriage in process or an abnormal pregnancy, but often is innocent and the pregnancy will continue normally. We may do blood pregnancy tests and ultrasound to try to determine what is causing the bleeding in your case, but sometimes we can't tell and need to follow you with time, more blood tests, and another ultrasound.     Return to the Emergency Department if:    You have severe abdominal or pelvic pain.    You faint, or feel lightheaded or dizzy.     Your bleeding is gets much worse, and is heavier than a heavy period or if you pass any blood clots larger than a quarter.    You pass any tissue--solid material that doesn't appear smooth and even like a blood clot. If you pass tissue, save it (even if you have to pull it out of the toilet) and put it in a plastic bag or jar and bring it in.      You have a fever of 100.5 degrees or higher.  If no pregnancy could be seen:    It may be that everything is normal and it is just too early to see the pregnancy, but you could have an ectopic pregnancy, which is a pregnancy in an  abnormal location, such as in the tube. An ectopic pregnancy can cause severe internal bleeding or death.    You need to be seen by your regular doctor, or an OB/GYN doctor, in 48-72 hours for a repeat blood pregnancy test.    You should not be alone, in case you suddenly become very sick.     You should not have sex or put anything in your vagina.     If a pregnancy was seen in your uterus:    If a heartbeat could be seen, the chance of miscarriage is much lower.    You need to see your regular doctor, or an OB/GYN doctor, within 2-3 days.    You should not have sex or put anything in your vagina.     Facts about miscarriage: We hope you don't have a miscarriage, but if you do, here are important things to know:    Early miscarriage is very common, and having one miscarriage doesn't mean you will have problems with another pregnancy.    Nothing you did caused it. Taking medicine, drinking alcohol, having sex, exercising, or falling down won't cause a miscarriage.     If you were given a prescription for medicine here today, be sure to read all of the information (including the package insert) that comes with your prescription.  This will include important information about the medicine, its side effects, and any warnings that you need to know about.  The pharmacist who fills the prescription can provide more information and answer questions you may have about the medicine.  If you have questions or concerns that the pharmacist cannot address, please call or return to the Emergency Department.     Opioid Medication Information    Pain medications are among the most commonly prescribed medicines, so we are including this information for all our patients. If you did not receive pain medication or get a prescription for pain medicine, you can ignore it.     You may have been given a prescription for an opioid (narcotic) pain medicine and/or have received a pain medicine while here in the Emergency Department. These  medicines can make you drowsy or impaired. You must not drive, operate dangerous equipment, or engage in any other dangerous activities while taking these medications. If you drive while taking these medications, you could be arrested for DUI, or driving under the influence. Do not drink any alcohol while you are taking these medications.     Opioid pain medications can cause addiction. If you have a history of chemical dependency of any type, you are at a higher risk of becoming addicted to pain medications.  Only take these prescribed medications to treat your pain when all other options have been tried. Take it for as short a time and as few doses as possible. Store your pain pills in a secure place, as they are frequently stolen and provide a dangerous opportunity for children or visitors in your house to start abusing these powerful medications. We will not replace any lost or stolen medicine.  As soon as your pain is better, you should flush all your remaining medication.     Many prescription pain medications contain Tylenol  (acetaminophen), including Vicodin , Tylenol #3 , Norco , Lortab , and Percocet .  You should not take any extra pills of Tylenol  if you are using these prescription medications or you can get very sick.  Do not ever take more than 3000 mg of acetaminophen in any 24 hour period.    All opioids tend to cause constipation. Drink plenty of water and eat foods that have a lot of fiber, such as fruits, vegetables, prune juice, apple juice and high fiber cereal.  Take a laxative if you don t move your bowels at least every other day. Miralax , Milk of Magnesia, Colace , or Senna  can be used to keep you regular.      Remember that you can always come back to the Emergency Department if you are not able to see your regular doctor in the amount of time listed above, if you get any new symptoms, or if there is anything that worries you.        24 Hour Appointment Hotline       To make an  appointment at any [facility] clinic, call [phone] ([phone]). If you don't have a family doctor or clinic, we will help you find one. [facility] clinics are conveniently located to serve the needs of you and your family.             Review of your medicines      START taking        Dose / Directions Last dose taken    HYDROcodone-acetaminophen 5-325 MG per tablet   Commonly known as:  NORCO   Dose:  1-2 tablet   Quantity:  15 tablet        Take 1-2 tablets by mouth every 4 hours as needed for moderate to severe pain   Refills:  0        metoclopramide 10 MG tablet   Commonly known as:  REGLAN   Dose:  10 mg   Quantity:  20 tablet        Take 1 tablet (10 mg) by mouth 4 times daily as needed   Refills:  0          Our records show that you are taking the medicines listed below. If these are incorrect, please call your family doctor or clinic.        Dose / Directions Last dose taken    Calcium Carb-Cholecalciferol 1000-800 MG-UNIT Tabs   Commonly known as:  CALCIUM 1000 + D   Dose:  1 tablet   Quantity:  100 tablet        Take 1 tablet by mouth daily TAKE WITH FOOD, FOR BONE HEALTH AND FOR VITAMIN D SUPPLEMENTATION   Refills:  PRN        cholecalciferol 44660 UNITS capsule   Commonly known as:  VITAMIN D3   Quantity:  40 capsule        1 CAP 2 X A WK FOR 2.5 WKS, THEN 1 CAP 1ST AND 15TH OF EACH MONTH, FOR VIT D DEFICIENCY   Refills:  0        clindamycin 300 MG capsule   Commonly known as:  CLEOCIN   Dose:  300 mg   Quantity:  30 capsule        Take 1 capsule (300 mg) by mouth 3 times daily   Refills:  0        MELATONIN PO        Refills:  0        valACYclovir 1000 mg tablet   Commonly known as:  VALTREX   Dose:  1000 mg   Quantity:  21 tablet        Take 1 tablet (1,000 mg) by mouth 3 times daily for 7 days   Refills:  0                Prescriptions were sent or printed at these locations (2 Prescriptions)                   Other Prescriptions                Printed at Department/Unit printer (2 of 2)          HYDROcodone-acetaminophen (NORCO) 5-325 MG per tablet               metoclopramide (REGLAN) 10 MG tablet                Procedures and tests performed during your visit     CBC with platelets differential    HCG QUANTitative pregnancy    US OB < 14 Weeks w Transvaginal      Orders Needing Specimen Collection     None      Pending Results     Date and Time Order Name Status Description    12/11/2017 2048 US OB < 14 Weeks w Transvaginal Preliminary             Pending Culture Results     No orders found from 12/9/2017 to 12/12/2017.            Pending Results Instructions     If you had any lab results that were not finalized at the time of your Discharge, you can call the ED Lab Result RN at [phone]. You will be contacted by this team for any positive Lab results or changes in treatment. The nurses are available 7 days a week from 10A to 6:30P.  You can leave a message 24 hours per day and they will return your call.        Test Results From Your Hospital Stay        12/11/2017  9:28 PM      Component Results     Component Value Ref Range & Units Status    WBC 5.9 4.0 - 11.0 10e9/L Final    RBC Count 5.78 (H) 3.8 - 5.2 10e12/L Final    Hemoglobin 12.6 11.7 - 15.7 g/dL Final    Hematocrit 40.2 35.0 - 47.0 % Final    MCV 70 (L) 78 - 100 fl Final    MCH 21.8 (L) 26.5 - 33.0 pg Final    MCHC 31.3 (L) 31.5 - 36.5 g/dL Final    RDW 15.9 (H) 10.0 - 15.0 % Final    Platelet Count 279 150 - 450 10e9/L Final    Diff Method Automated Method  Final    % Neutrophils 45.3 % Final    % Lymphocytes 45.0 % Final    % Monocytes 7.9 % Final    % Eosinophils 1.0 % Final    % Basophils 0.5 % Final    % Immature Granulocytes 0.3 % Final    Nucleated RBCs 0 0 /100 Final    Absolute Neutrophil 2.7 1.6 - 8.3 10e9/L Final    Absolute Lymphocytes 2.7 0.8 - 5.3 10e9/L Final    Absolute Monocytes 0.5 0.0 - 1.3 10e9/L Final    Absolute Eosinophils 0.1 0.0 - 0.7 10e9/L Final    Absolute Basophils 0.0 0.0 - 0.2 10e9/L Final    Abs  Immature Granulocytes 0.0 0 - 0.4 10e9/L Final    Absolute Nucleated RBC 0.0  Final         12/11/2017 10:54 PM      Narrative     OBSTETRIC ULTRASOUND LESS THAN 14 WEEKS WITH TRANSVAGINAL SINGLE   12/11/2017 10:47 PM     HISTORY: Bleeding, about 6 weeks by dates.    COMPARISON: None.    FINDINGS: Transabdominal and endovaginal scan for better evaluation of  possible pregnancy.    No intrauterine gestational sac is identified. There is a small amount  of fluid within the endometrium.    Ectopic pregnancy cannot be ruled out in this setting in view of the  positive pregnancy test and no visible intrauterine gestational sac.    Ovaries appear normal.    No free fluid in the cul-de-sac.        Impression     IMPRESSION:   1. No intrauterine gestational sac identified.  2. Ectopic pregnancy cannot be excluded.  3. Follow-up recommended.         12/11/2017  9:46 PM      Component Results     Component Value Ref Range & Units Status    HCG Quantitative Serum 433 (H) 0 - 5 IU/L Final                Clinical Quality Measure: Blood Pressure Screening     Your blood pressure was checked while you were in the emergency department today. The last reading we obtained was  BP: 116/77 . Please read the guidelines below about what these numbers mean and what you should do about them.  If your systolic blood pressure (the top number) is less than 120 and your diastolic blood pressure (the bottom number) is less than 80, then your blood pressure is normal. There is nothing more that you need to do about it.  If your systolic blood pressure (the top number) is 120-139 or your diastolic blood pressure (the bottom number) is 80-89, your blood pressure may be higher than it should be. You should have your blood pressure rechecked within a year by a primary care provider.  If your systolic blood pressure (the top number) is 140 or greater or your diastolic blood pressure (the bottom number) is 90 or greater, you may have high blood  pressure. High blood pressure is treatable, but if left untreated over time it can put you at risk for heart attack, stroke, or kidney failure. You should have your blood pressure rechecked by a primary care provider within the next 4 weeks.  If your provider in the emergency department today gave you specific instructions to follow-up with your doctor or provider even sooner than that, you should follow that instruction and not wait for up to 4 weeks for your follow-up visit.        Thank you for choosing [facility]       Thank you for choosing [facility] for your care. Our goal is always to provide you with excellent care. Hearing back from our patients is one way we can continue to improve our services. Please take a few minutes to complete the written survey that you may receive in the mail after you visit with us. Thank you!        [facility] Information     [facility] gives you secure access to your electronic health record. If you see a primary care provider, you can also send messages to your care team and make appointments. If you have questions, please call your primary care clinic.  If you do not have a primary care provider, please call [phone] and they will assist you.        Care EveryWhere ID     This is your Care EveryWhere ID. This could be used by other organizations to access your [facility] medical records  [id number]        Equal Access to Services     CAROL GAYLE : Naida Brown, patsy liang, yogesh meyers, porfirio bowen. So [facility] [phone].    ATENCIÓN: Si habla español, tiene a conley disposición servicios gratuitos de asistencia lingüística. Llame al [phone].    We comply with applicable federal civil rights laws and [place] laws. We do not discriminate on the basis of race, color, national origin, age, disability, sex, sexual orientation, or gender identity.            After Visit Summary       This is your record. Keep this with  you and show to your community pharmacist(s) and doctor(s) at your next visit.

## 2017-12-11 NOTE — ED AVS SNAPSHOT
[facility] Emergency Department    [address]    [facility] [address]    Phone:  [phone]    Fax:  [phone]                                       Gutierrez Mar   MRN: [id number]    Department:  [facility] Emergency Department   Date of Visit:  12/11/2017           After Visit Summary Signature Page     I have received my discharge instructions, and my questions have been answered. I have discussed any challenges I see with this plan with the nurse or doctor.    ..........................................................................................................................................  Patient/Patient Representative Signature      ..........................................................................................................................................  Patient Representative Print Name and Relationship to Patient    ..................................................               ................................................  Date                                            Time    ..........................................................................................................................................  Reviewed by Signature/Title    ...................................................              ..............................................  Date                                                            Time

## 2017-12-12 NOTE — ED NOTES
Pt reports she had a positive pregnancy test at home 2 weeks ago. LMP 10/29/17. Pt has been having vaginal bleeding for the last 5-6 days, denies any pain or cramping.

## 2017-12-12 NOTE — PROGRESS NOTES
Patient came here to check wether she has miscarriage or not. She was seen in [facility] and was noted to have positive urine pregnancy test about 2 weeks ago. She is actually thinking to do termination of pregnancy but she has been having perivaginal bleeding. She is not sure if she is having miscarriage or not. This been going on for the last 1 week. She denies any chest pain or shortness of breath. Denies any nausea or vomiting. Denies any lightheadedness.   I did mention to her that in the urgent care we cannot do any ob ultrasound and she probably need an ultrasound to see how far along of her pregnancy and if there still viable product of conception to confirm if she is having miscarriage or not. I recommend her to go to ER if she want to have confirmation and she agree with that. She will go to ER right now  No charge for this visit

## 2017-12-12 NOTE — NURSING NOTE
"Chief Complaint   Patient presents with     Vaginal Bleeding     pt has had positive hcg test, states has had vaginal bleeding for past week       Initial /80 (Cuff Size: Adult Regular)  Pulse 73  Temp 98  F (36.7  C) (Oral)  Resp 20  Wt 175 lb (79.4 kg)  LMP 10/23/2017  BMI 31.5 kg/m2 Estimated body mass index is 31.5 kg/(m^2) as calculated from the following:    Height as of 4/11/17: 5' 2.5\" (1.588 m).    Weight as of this encounter: 175 lb (79.4 kg).  Medication Reconciliation: complete Raciel CLEMENT    "

## 2017-12-12 NOTE — ED PROVIDER NOTES
History     Chief Complaint:  Vaginal bleeding    HPI   Gutierrez Mar is a 27 year old female ( A1) who presents with vaginal bleeding. The patient states that she had taken an at home pregnancy test at home approximately 2 weeks ago that returned positive. She has not followed an OB GYN for this pregnancy yet. Recently, the patient states that she has had 5-6 days of vaginal bleeding, comparable to her menstrual bleeding, prompting her ED visit. Here, the patient states that she does not have any associated abdominal cramping, but does have pelvic discomfort on her right side. She denies a history of ectopic pregnancies. Her last menstrual period was .      Allergies:  Keflet [Cephalexin]  Oxycodone      Medications:    Melatonin  Valtrex  Cleocin    Past Medical History:    Clostridium difficile diarrhea  Sickle cell trait     Past Surgical History:    Cosmetic surgery  Mammoplasty augmentation     Family History:    No past pertinent family history.     Social History:  Negative for alcohol use.  Negative for tobacco use.   Marital Status:  Single [1]     Review of Systems   Gastrointestinal: Negative for abdominal pain.   Genitourinary: Positive for pelvic pain and vaginal bleeding.   All other systems reviewed and are negative.      Physical Exam   First Vitals:  BP: 133/73  Pulse: 60  Temp: 96.9  F (36.1  C)  Resp: 18  SpO2: 100 %    Physical Exam   Constitutional: She is cooperative.   HENT:   Right Ear: Tympanic membrane normal.   Left Ear: Tympanic membrane normal.   Mouth/Throat: Oropharynx is clear and moist and mucous membranes are normal.   Eyes: Conjunctivae are normal.   Neck: Normal range of motion.   Cardiovascular: Regular rhythm and normal heart sounds.    Pulmonary/Chest: Effort normal and breath sounds normal.   Abdominal: Soft. Normal appearance and bowel sounds are normal. There is tenderness. There is no rebound and no guarding.   Mild lower abd tenderness    Musculoskeletal: Normal range of motion.   Lymphadenopathy:     She has no cervical adenopathy.   Neurological: She is alert.   Skin: Skin is warm and dry.   Psychiatric: She has a normal mood and affect.       Emergency Department Course   Imaging:  Radiographic findings were communicated with the patient who voiced understanding of the findings.  US OB <14 Weeks w Transvaginal:  1. No intrauterine gestational sac identified.  2. Ectopic pregnancy cannot be excluded.  3. Follow-up recommended. As per radiology.     Laboratory:  Rh testing from February 2009: A+    CBC: WBC: 5.9, HGB: 12.6, PLT: 279  HCG quantitative:433 (H)    Interventions:   2130 Reglan, 5 mg, IV  2133 Morphine, 4 mg, IV    Emergency Department Course:  Nursing notes and vitals reviewed. I performed an exam of the patient as documented above.      IV inserted. Medicine administered as documented above. Blood drawn. This was sent to the lab for further testing, results above.    The patient was sent for a OB <14 weeks while in the emergency department, findings above.     2123 I rechecked the patient and discussed the results of her workup thus far. I had rechecked the patient as well as repeat her abdominal exam and had discussed medication options.     Findings and plan explained to the Patient. Patient discharged home with instructions regarding supportive care, medications, and reasons to return. The importance of close follow-up was reviewed. The patient was prescribed Norco and Tylenol.     I personally reviewed the laboratory results with the Patient and answered all related questions prior to discharge.       Impression & Plan      Medical Decision Making:  Gutierrez Mar is a 27 year old female who presents complaining of 1 week of vaginal bleeding in early pregnancy. She strongly prefers to avoid pelvic exam. Given lab and imaging, I agree this would likelyadd little, so I did not insist. As noted, she is documented to have blood  type A+. CBC shows an adequate hemoglobin with marked microcytosis, though, this is not a new finding. Quantitative HCG returns unexpectedly low at 433. I discussed with her given the timing of her positive home pregnancy test, this almost certainly suggests a failed pregnancy, but, though, could still be consist with tubal ectopic pregnancy. Ultrasound did not show any evidence of pregnancy. I will discharge the patient with ectopic per cautions, follow up in 2-3 days in clinic, return with heavy bleeding, severe abdominal pain, or other problems.     Diagnosis:    ICD-10-CM   1. Threatened miscarriage O20.0       Disposition:  discharged to home    Discharge Medications:  New Prescriptions    HYDROCODONE-ACETAMINOPHEN (NORCO) 5-325 MG PER TABLET    Take 1-2 tablets by mouth every 4 hours as needed for moderate to severe pain    METOCLOPRAMIDE (REGLAN) 10 MG TABLET    Take 1 tablet (10 mg) by mouth 4 times daily as needed     I, Kadie Lewis, am serving as a scribe on 12/11/2017 at 8:43 PM to personally document services performed by Barbara Woodward MD based on my observations and the provider's statements to me.      Kadie Lewis  12/11/2017   [facility] EMERGENCY DEPARTMENT       Barbara Woodward MD  12/12/17 0048

## 2017-12-15 PROCEDURE — 84702 CHORIONIC GONADOTROPIN TEST: CPT | Performed by: EMERGENCY MEDICINE

## 2017-12-15 PROCEDURE — 99284 EMERGENCY DEPT VISIT MOD MDM: CPT | Mod: 25

## 2017-12-15 PROCEDURE — 36415 COLL VENOUS BLD VENIPUNCTURE: CPT | Performed by: EMERGENCY MEDICINE

## 2017-12-15 PROCEDURE — 85025 COMPLETE CBC W/AUTO DIFF WBC: CPT | Performed by: EMERGENCY MEDICINE

## 2017-12-16 ENCOUNTER — HOSPITAL ENCOUNTER (EMERGENCY)
Facility: CLINIC | Age: 27
Discharge: HOME | End: 2017-12-16
Attending: EMERGENCY MEDICINE | Admitting: EMERGENCY MEDICINE
Payer: COMMERCIAL

## 2017-12-16 ENCOUNTER — APPOINTMENT (OUTPATIENT)
Dept: ULTRASOUND IMAGING | Facility: CLINIC | Age: 27
End: 2017-12-16
Attending: EMERGENCY MEDICINE
Payer: COMMERCIAL

## 2017-12-16 VITALS
DIASTOLIC BLOOD PRESSURE: 66 MMHG | TEMPERATURE: 97.9 F | HEART RATE: 61 BPM | SYSTOLIC BLOOD PRESSURE: 115 MMHG | RESPIRATION RATE: 16 BRPM | OXYGEN SATURATION: 100 %

## 2017-12-16 DIAGNOSIS — O20.0 THREATENED MISCARRIAGE: ICD-10-CM

## 2017-12-16 LAB
B-HCG SERPL-ACNC: 489 IU/L (ref 0–5)
BASOPHILS # BLD AUTO: 0 10E9/L (ref 0–0.2)
BASOPHILS NFR BLD AUTO: 0.5 %
DIFFERENTIAL METHOD BLD: ABNORMAL
EOSINOPHIL # BLD AUTO: 0.1 10E9/L (ref 0–0.7)
EOSINOPHIL NFR BLD AUTO: 1.1 %
ERYTHROCYTE [DISTWIDTH] IN BLOOD BY AUTOMATED COUNT: 15.9 % (ref 10–15)
HCT VFR BLD AUTO: 39.6 % (ref 35–47)
HGB BLD-MCNC: 12.1 G/DL (ref 11.7–15.7)
IMM GRANULOCYTES # BLD: 0 10E9/L (ref 0–0.4)
IMM GRANULOCYTES NFR BLD: 0.2 %
LYMPHOCYTES # BLD AUTO: 2.9 10E9/L (ref 0.8–5.3)
LYMPHOCYTES NFR BLD AUTO: 52.1 %
MCH RBC QN AUTO: 21.4 PG (ref 26.5–33)
MCHC RBC AUTO-ENTMCNC: 30.6 G/DL (ref 31.5–36.5)
MCV RBC AUTO: 70 FL (ref 78–100)
MONOCYTES # BLD AUTO: 0.4 10E9/L (ref 0–1.3)
MONOCYTES NFR BLD AUTO: 7.8 %
NEUTROPHILS # BLD AUTO: 2.1 10E9/L (ref 1.6–8.3)
NEUTROPHILS NFR BLD AUTO: 38.3 %
NRBC # BLD AUTO: 0 10*3/UL
NRBC BLD AUTO-RTO: 0 /100
PLATELET # BLD AUTO: 287 10E9/L (ref 150–450)
RBC # BLD AUTO: 5.66 10E12/L (ref 3.8–5.2)
WBC # BLD AUTO: 5.5 10E9/L (ref 4–11)

## 2017-12-16 PROCEDURE — 76801 OB US < 14 WKS SINGLE FETUS: CPT

## 2017-12-16 NOTE — ED AVS SNAPSHOT
[facility] Emergency Department    [address] [place] [address]    Phone:  [phone]    Fax:  [phone]                                       Gutierrez Mar   MRN: [id number]    Department:  [facility] Emergency Department   Date of Visit:  12/15/2017           Patient Information     Date Of Birth          1990        Your diagnoses for this visit were:     Threatened miscarriage        You were seen by Salvador Vallejo MD.      Follow-up Information     Follow up with Clinic, [facility]. Schedule an appointment as soon as possible for a visit in 2 days.    Contact information:    [address] [facility] [address]  [phone]          Discharge Instructions       Discharge Instructions  Vaginal Bleeding in Pregnancy    Bleeding in early pregnancy can be a sign of a miscarriage in process or an abnormal pregnancy, but often is innocent and the pregnancy will continue normally. We may do blood pregnancy tests and ultrasound to try to determine what is causing the bleeding in your case, but sometimes we can't tell and need to follow you with time, more blood tests, and another ultrasound.     Return to the Emergency Department if:    You have severe abdominal or pelvic pain.    You faint, or feel lightheaded or dizzy.     Your bleeding is gets much worse, and is heavier than a heavy period or if you pass any blood clots larger than a quarter.    You pass any tissue--solid material that doesn't appear smooth and even like a blood clot. If you pass tissue, save it (even if you have to pull it out of the toilet) and put it in a plastic bag or jar and bring it in.      You have a fever of 100.5 degrees or higher.  If no pregnancy could be seen:    It may be that everything is normal and it is just too early to see the pregnancy, but you could have an ectopic pregnancy, which is a pregnancy in an abnormal location, such as in the  tube. An ectopic pregnancy can cause severe internal bleeding or death.    You need to be seen by your regular doctor, or an OB/GYN doctor, in 48-72 hours for a repeat blood pregnancy test.    You should not be alone, in case you suddenly become very sick.     You should not have sex or put anything in your vagina.     If a pregnancy was seen in your uterus:    If a heartbeat could be seen, the chance of miscarriage is much lower.    You need to see your regular doctor, or an OB/GYN doctor, within 2-3 days.    You should not have sex or put anything in your vagina.     Facts about miscarriage: We hope you don't have a miscarriage, but if you do, here are important things to know:    Early miscarriage is very common, and having one miscarriage doesn't mean you will have problems with another pregnancy.    Nothing you did caused it. Taking medicine, drinking alcohol, having sex, exercising, or falling down won't cause a miscarriage.     If you were given a prescription for medicine here today, be sure to read all of the information (including the package insert) that comes with your prescription.  This will include important information about the medicine, its side effects, and any warnings that you need to know about.  The pharmacist who fills the prescription can provide more information and answer questions you may have about the medicine.  If you have questions or concerns that the pharmacist cannot address, please call or return to the Emergency Department.     Opioid Medication Information    Pain medications are among the most commonly prescribed medicines, so we are including this information for all our patients. If you did not receive pain medication or get a prescription for pain medicine, you can ignore it.     You may have been given a prescription for an opioid (narcotic) pain medicine and/or have received a pain medicine while here in the Emergency Department. These medicines can make you drowsy or  impaired. You must not drive, operate dangerous equipment, or engage in any other dangerous activities while taking these medications. If you drive while taking these medications, you could be arrested for DUI, or driving under the influence. Do not drink any alcohol while you are taking these medications.     Opioid pain medications can cause addiction. If you have a history of chemical dependency of any type, you are at a higher risk of becoming addicted to pain medications.  Only take these prescribed medications to treat your pain when all other options have been tried. Take it for as short a time and as few doses as possible. Store your pain pills in a secure place, as they are frequently stolen and provide a dangerous opportunity for children or visitors in your house to start abusing these powerful medications. We will not replace any lost or stolen medicine.  As soon as your pain is better, you should flush all your remaining medication.     Many prescription pain medications contain Tylenol  (acetaminophen), including Vicodin , Tylenol #3 , Norco , Lortab , and Percocet .  You should not take any extra pills of Tylenol  if you are using these prescription medications or you can get very sick.  Do not ever take more than 3000 mg of acetaminophen in any 24 hour period.    All opioids tend to cause constipation. Drink plenty of water and eat foods that have a lot of fiber, such as fruits, vegetables, prune juice, apple juice and high fiber cereal.  Take a laxative if you don t move your bowels at least every other day. Miralax , Milk of Magnesia, Colace , or Senna  can be used to keep you regular.      Remember that you can always come back to the Emergency Department if you are not able to see your regular doctor in the amount of time listed above, if you get any new symptoms, or if there is anything that worries you.          24 Hour Appointment Hotline       To make an appointment at any [facility],  call [phone] ([phone]). If you don't have a family doctor or clinic, we will help you find one. [facility] are conveniently located to serve the needs of you and your family.             Review of your medicines      Our records show that you are taking the medicines listed below. If these are incorrect, please call your family doctor or clinic.        Dose / Directions Last dose taken    Calcium Carb-Cholecalciferol 1000-800 MG-UNIT Tabs   Commonly known as:  CALCIUM 1000 + D   Dose:  1 tablet   Quantity:  100 tablet        Take 1 tablet by mouth daily TAKE WITH FOOD, FOR BONE HEALTH AND FOR VITAMIN D SUPPLEMENTATION   Refills:  PRN        cholecalciferol 23817 UNITS capsule   Commonly known as:  VITAMIN D3   Quantity:  40 capsule        1 CAP 2 X A WK FOR 2.5 WKS, THEN 1 CAP 1ST AND 15TH OF EACH MONTH, FOR VIT D DEFICIENCY   Refills:  0        clindamycin 300 MG capsule   Commonly known as:  CLEOCIN   Dose:  300 mg   Quantity:  30 capsule        Take 1 capsule (300 mg) by mouth 3 times daily   Refills:  0        HYDROcodone-acetaminophen 5-325 MG per tablet   Commonly known as:  NORCO   Dose:  1-2 tablet   Quantity:  15 tablet        Take 1-2 tablets by mouth every 4 hours as needed for moderate to severe pain   Refills:  0        MELATONIN PO        Refills:  0        metoclopramide 10 MG tablet   Commonly known as:  REGLAN   Dose:  10 mg   Quantity:  20 tablet        Take 1 tablet (10 mg) by mouth 4 times daily as needed   Refills:  0        valACYclovir 1000 mg tablet   Commonly known as:  VALTREX   Dose:  1000 mg   Quantity:  21 tablet        Take 1 tablet (1,000 mg) by mouth 3 times daily for 7 days   Refills:  0                Procedures and tests performed during your visit     CBC + differential    HCG QUANTitative pregnancy (blood)    US OB < 14 Weeks w Transvaginal      Orders Needing Specimen Collection     None      Pending Results     Date and Time Order Name Status Description     12/16/2017 0113 US OB < 14 Weeks w Transvaginal Preliminary             Pending Culture Results     No orders found from 12/14/2017 to 12/17/2017.            Pending Results Instructions     If you had any lab results that were not finalized at the time of your Discharge, you can call the ED Lab Result RN at [phone]. You will be contacted by this team for any positive Lab results or changes in treatment. The nurses are available 7 days a week from 10A to 6:30P.  You can leave a message 24 hours per day and they will return your call.        Test Results From Your Hospital Stay        12/16/2017 12:13 AM      Component Results     Component Value Ref Range & Units Status    HCG Quantitative Serum 489 (H) 0 - 5 IU/L Final         12/16/2017  2:09 AM      Narrative     US OB <14 WEEKS WITH TRANSVAGINAL SINGLE  12/16/2017 2:01 AM      HISTORY: Pregnant with vaginal bleeding and right lower quadrant pain.      COMPARISON: 12/11/2017.    FINDINGS: Transabdominal and transvaginal imaging performed.  Transvaginal imaging performed to better visualize the endometrium and  adnexa. There is again a small fluid collection in the endometrium  with a mean diameter of 0.4 cm. This is larger than on the previous  exam could be an early gestational sac measuring approximately 5 weeks  0 days. No yolk sac or fetal pole. This fluid collection is located in  the endometrial cavity of the mid uterus. The ovaries appear normal  bilaterally. No adnexal mass. No free pelvic fluid.        Impression     IMPRESSION: A small endometrial fluid collection may be an early  gestational sac without yolk sac or fetal pole measuring 5 weeks 0  days. Ectopic pregnancy is not ruled out. Follow-up recommended.         12/16/2017  1:27 AM      Component Results     Component Value Ref Range & Units Status    WBC 5.5 4.0 - 11.0 10e9/L Final    RBC Count 5.66 (H) 3.8 - 5.2 10e12/L Final    Hemoglobin 12.1 11.7 - 15.7 g/dL Final    Hematocrit 39.6  35.0 - 47.0 % Final    MCV 70 (L) 78 - 100 fl Final    MCH 21.4 (L) 26.5 - 33.0 pg Final    MCHC 30.6 (L) 31.5 - 36.5 g/dL Final    RDW 15.9 (H) 10.0 - 15.0 % Final    Platelet Count 287 150 - 450 10e9/L Final    Diff Method Automated Method  Final    % Neutrophils 38.3 % Final    % Lymphocytes 52.1 % Final    % Monocytes 7.8 % Final    % Eosinophils 1.1 % Final    % Basophils 0.5 % Final    % Immature Granulocytes 0.2 % Final    Nucleated RBCs 0 0 /100 Final    Absolute Neutrophil 2.1 1.6 - 8.3 10e9/L Final    Absolute Lymphocytes 2.9 0.8 - 5.3 10e9/L Final    Absolute Monocytes 0.4 0.0 - 1.3 10e9/L Final    Absolute Eosinophils 0.1 0.0 - 0.7 10e9/L Final    Absolute Basophils 0.0 0.0 - 0.2 10e9/L Final    Abs Immature Granulocytes 0.0 0 - 0.4 10e9/L Final    Absolute Nucleated RBC 0.0  Final                Clinical Quality Measure: Blood Pressure Screening     Your blood pressure was checked while you were in the emergency department today. The last reading we obtained was  BP: 122/63 . Please read the guidelines below about what these numbers mean and what you should do about them.  If your systolic blood pressure (the top number) is less than 120 and your diastolic blood pressure (the bottom number) is less than 80, then your blood pressure is normal. There is nothing more that you need to do about it.  If your systolic blood pressure (the top number) is 120-139 or your diastolic blood pressure (the bottom number) is 80-89, your blood pressure may be higher than it should be. You should have your blood pressure rechecked within a year by a primary care provider.  If your systolic blood pressure (the top number) is 140 or greater or your diastolic blood pressure (the bottom number) is 90 or greater, you may have high blood pressure. High blood pressure is treatable, but if left untreated over time it can put you at risk for heart attack, stroke, or kidney failure. You should have your blood pressure rechecked by  a primary care provider within the next 4 weeks.  If your provider in the emergency department today gave you specific instructions to follow-up with your doctor or provider even sooner than that, you should follow that instruction and not wait for up to 4 weeks for your follow-up visit.        Thank you for choosing [facility]       Thank you for choosing [facility] for your care. Our goal is always to provide you with excellent care. Hearing back from our patients is one way we can continue to improve our services. Please take a few minutes to complete the written survey that you may receive in the mail after you visit with us. Thank you!        "[employer] Information     [employer] gives you secure access to your electronic health record. If you see a primary care provider, you can also send messages to your care team and make appointments. If you have questions, please call your primary care clinic.  If you do not have a primary care provider, please call [phone] and they will assist you.        Care EveryWhere ID     This is your Care EveryWhere ID. This could be used by other organizations to access your [facility] medical records  [id number]        Equal Access to Services     CAROL GAYLE : Hadmahesh nieto Sojen, waaxda luraymond, qaybta kaaldileep meyers, porfirio leary . So [facility] [phone].    ATENCIÓN: Si habla español, tiene a conley disposición servicios gratuitos de asistencia lingüística. Llame al [phone].    We comply with applicable federal civil rights laws and [place] laws. We do not discriminate on the basis of race, color, national origin, age, disability, sex, sexual orientation, or gender identity.            After Visit Summary       This is your record. Keep this with you and show to your community pharmacist(s) and doctor(s) at your next visit.

## 2017-12-16 NOTE — ED AVS SNAPSHOT
[facility] Emergency Department    [address]    [facility] [address]    Phone:  [phone]    Fax:  [phone]                                       Gutierrez Mar   MRN: [id number]    Department:  [facility] Emergency Department   Date of Visit:  12/15/2017           After Visit Summary Signature Page     I have received my discharge instructions, and my questions have been answered. I have discussed any challenges I see with this plan with the nurse or doctor.    ..........................................................................................................................................  Patient/Patient Representative Signature      ..........................................................................................................................................  Patient Representative Print Name and Relationship to Patient    ..................................................               ................................................  Date                                            Time    ..........................................................................................................................................  Reviewed by Signature/Title    ...................................................              ..............................................  Date                                                            Time

## 2017-12-16 NOTE — ED NOTES
ABC's intact.  Alert and oriented x4.    Pt states she continues to have vaginal bleeding and low abd pain.  Pt told by her PMD to have follow up HCG.

## 2017-12-17 NOTE — ED PROVIDER NOTES
CHIEF COMPLAINT:  Vaginal bleeding.      HISTORY OF PRESENT ILLNESS:  Gutierrez Mar is a 27-year-old female who came in complaining of vaginal bleeding, spotting.  She was previously seen here on 12/11/2017 and was noted to have a low hCG, pregnancy at 6 weeks, and was told to follow up with [facility].  The patient is complaining of right lower quadrant mild abdominal pain as well as continued vaginal bleeding, about 3 pads per day.  Does not feel lightheaded, has not passed out.  No dysuria noted, no fevers.  She called a nurse line and was told to come in to be reevaluated.      ALLERGIES Keflex, oxycodone.      MEDICATIONS:  Melatonin, Valtrex, Cleocin.        PAST MEDICAL HISTORY:  Sickle cell trait, C. diff diarrhea.      SOCIAL HISTORY:  The patient does not use alcohol or tobacco.      REVIEW OF SYSTEMS:     GASTROINTESTINAL:  Positive for abdominal pain.    GENITOURINARY:  Positive for pelvic pain and vaginal bleeding.     All other review of systems are negative.      PHYSICAL EXAMINATION:   VITAL SIGNS:  Temperature 97.9, pulse 61, respiratory rate 16, blood pressure 129/86, pulse ox 98% on room air.   GENERAL:  The patient is well appearing.     ORAL:  Moist mucous membrane.   NECK:  Supple.   HEART:  S1, S2, regular rate and rhythm.  No murmurs, rubs or gallops.   LUNGS:  Clear to auscultation bilaterally.  No wheezing, rales or rhonchi.   ABDOMEN:  Bowel sounds positive.  Soft, nontender, nondistended, no organomegaly.   MUSCULOSKELETAL:  2+ distal pulses.   NEUROLOGIC:  The patient is alert and oriented x3.  Moves all 4 extremities spontaneously.     DERMATOLOGIC:  No pallor.      LABORATORY:  HCG is 489.  CBC is within normal limits.  Patient is Rh positive noted from labs done in 2009.        Ultrasound:  OB less than 14 weeks.  Impression:  Small endometrial fluid collection that may be an early gestational sac without yolk sac or fetal pole, measuring 5 weeks zero days.  Prior pregnancy is  not ruled out.  Followup recommended.      EMERGENCY DEPARTMENT COURSE:  The patient was seen by ED physician, ED nurse.  All findings were reviewed.  All questions were answered.  The case was discussed with [facility], Dr. Bradshaw, who requested outpatient followup on Monday with OB/GYN for repeat hCG and reevaluation.       INTERVENTIONS:  None.       MEDICAL DECISION MAKING:  This is a 27-year-old female who came in for return visit for continued vaginal spotting, low hCG with right lower quadrant abdominal pain.  Concern is for obvious nonviable pregnancy and/or ectopic pregnancy with non-appropriately elevating hCG that is still rising.  I did end up getting a repeat ultrasound that does show fluid within the endometrial lining that could be early gestational sac, but this cannot be confirmed.  She has no findings on ultrasound that would suggest ectopic pregnancy or ruptured ectopic pregnancy, although ectopic pregnancy is still within the differential.        I discussed the case with [facility], who does agree with assessment and requested the patient have a repeat hCG on Monday.  The patient has been given warning/precautions for ectopic pregnancy and is told to return for worsening abdominal pain, significant vaginal bleeding, feeling lightheaded, passing out, any symptoms or concerns.      DISPOSITION:  Home.  Follow up with OB/GYN.        DIAGNOSIS:  Threatened miscarriage.         KELIN RON MD             D: 2017 05:43   T: 2017 22:52   MT: KAREN#[id number]      Name:     GODFREY MOTT   MRN:      [id number]        Account:      [id number]   :      1990           Visit Date:   2017      Document: [id number]       cc: [facility]

## 2018-01-25 ENCOUNTER — HOSPITAL ENCOUNTER (EMERGENCY)
Facility: CLINIC | Age: 28
Discharge: HOME | End: 2018-01-25
Attending: EMERGENCY MEDICINE | Admitting: EMERGENCY MEDICINE

## 2018-01-25 ENCOUNTER — APPOINTMENT (OUTPATIENT)
Dept: ULTRASOUND IMAGING | Facility: CLINIC | Age: 28
End: 2018-01-25
Attending: EMERGENCY MEDICINE

## 2018-01-25 ENCOUNTER — APPOINTMENT (OUTPATIENT)
Dept: GENERAL RADIOLOGY | Facility: CLINIC | Age: 28
End: 2018-01-25
Attending: EMERGENCY MEDICINE

## 2018-01-25 VITALS
TEMPERATURE: 98.8 F | RESPIRATION RATE: 24 BRPM | DIASTOLIC BLOOD PRESSURE: 56 MMHG | OXYGEN SATURATION: 98 % | SYSTOLIC BLOOD PRESSURE: 108 MMHG

## 2018-01-25 DIAGNOSIS — N92.6 IRREGULAR MENSTRUAL CYCLE: ICD-10-CM

## 2018-01-25 DIAGNOSIS — R68.89 FLU-LIKE SYMPTOMS: ICD-10-CM

## 2018-01-25 DIAGNOSIS — N93.9 VAGINAL BLEEDING: ICD-10-CM

## 2018-01-25 LAB
ABO + RH BLD: NORMAL
ABO + RH BLD: NORMAL
ALBUMIN UR-MCNC: NEGATIVE MG/DL
ANION GAP SERPL CALCULATED.3IONS-SCNC: 8 MMOL/L (ref 3–14)
APPEARANCE UR: CLEAR
B-HCG SERPL-ACNC: <1 IU/L (ref 0–5)
BASOPHILS # BLD AUTO: 0 10E9/L (ref 0–0.2)
BASOPHILS NFR BLD AUTO: 0.2 %
BILIRUB UR QL STRIP: NEGATIVE
BLD GP AB SCN SERPL QL: NORMAL
BLOOD BANK CMNT PATIENT-IMP: NORMAL
BUN SERPL-MCNC: 7 MG/DL (ref 7–30)
CALCIUM SERPL-MCNC: 7.9 MG/DL (ref 8.5–10.1)
CHLORIDE SERPL-SCNC: 104 MMOL/L (ref 94–109)
CO2 SERPL-SCNC: 23 MMOL/L (ref 20–32)
COLOR UR AUTO: YELLOW
CREAT SERPL-MCNC: 0.89 MG/DL (ref 0.52–1.04)
DIFFERENTIAL METHOD BLD: ABNORMAL
EOSINOPHIL # BLD AUTO: 0 10E9/L (ref 0–0.7)
EOSINOPHIL NFR BLD AUTO: 0.2 %
ERYTHROCYTE [DISTWIDTH] IN BLOOD BY AUTOMATED COUNT: 15.1 % (ref 10–15)
FLUAV+FLUBV AG SPEC QL: NEGATIVE
FLUAV+FLUBV AG SPEC QL: NEGATIVE
GFR SERPL CREATININE-BSD FRML MDRD: 77 ML/MIN/1.7M2
GLUCOSE SERPL-MCNC: 89 MG/DL (ref 70–99)
GLUCOSE UR STRIP-MCNC: NEGATIVE MG/DL
HCT VFR BLD AUTO: 36.1 % (ref 35–47)
HGB BLD-MCNC: 11.3 G/DL (ref 11.7–15.7)
HGB UR QL STRIP: ABNORMAL
IMM GRANULOCYTES # BLD: 0 10E9/L (ref 0–0.4)
IMM GRANULOCYTES NFR BLD: 0.4 %
KETONES UR STRIP-MCNC: NEGATIVE MG/DL
LEUKOCYTE ESTERASE UR QL STRIP: NEGATIVE
LYMPHOCYTES # BLD AUTO: 1.1 10E9/L (ref 0.8–5.3)
LYMPHOCYTES NFR BLD AUTO: 20.7 %
MCH RBC QN AUTO: 22.2 PG (ref 26.5–33)
MCHC RBC AUTO-ENTMCNC: 31.3 G/DL (ref 31.5–36.5)
MCV RBC AUTO: 71 FL (ref 78–100)
MONOCYTES # BLD AUTO: 0.5 10E9/L (ref 0–1.3)
MONOCYTES NFR BLD AUTO: 9.3 %
NEUTROPHILS # BLD AUTO: 3.6 10E9/L (ref 1.6–8.3)
NEUTROPHILS NFR BLD AUTO: 69.2 %
NITRATE UR QL: NEGATIVE
NRBC # BLD AUTO: 0 10*3/UL
NRBC BLD AUTO-RTO: 0 /100
PH UR STRIP: 6 PH (ref 5–7)
PLATELET # BLD AUTO: 197 10E9/L (ref 150–450)
POTASSIUM SERPL-SCNC: 3.5 MMOL/L (ref 3.4–5.3)
RBC # BLD AUTO: 5.1 10E12/L (ref 3.8–5.2)
RBC #/AREA URNS AUTO: 1 /HPF (ref 0–2)
SODIUM SERPL-SCNC: 135 MMOL/L (ref 133–144)
SOURCE: ABNORMAL
SP GR UR STRIP: 1 (ref 1–1.03)
SPECIMEN EXP DATE BLD: NORMAL
SPECIMEN SOURCE: NORMAL
SQUAMOUS #/AREA URNS AUTO: 1 /HPF (ref 0–1)
UROBILINOGEN UR STRIP-MCNC: 0 MG/DL (ref 0–2)
WBC # BLD AUTO: 5.2 10E9/L (ref 4–11)
WBC #/AREA URNS AUTO: <1 /HPF (ref 0–2)

## 2018-01-25 PROCEDURE — 25000132 ZZH RX MED GY IP 250 OP 250 PS 637: Performed by: EMERGENCY MEDICINE

## 2018-01-25 PROCEDURE — 71046 X-RAY EXAM CHEST 2 VIEWS: CPT

## 2018-01-25 PROCEDURE — 96360 HYDRATION IV INFUSION INIT: CPT

## 2018-01-25 PROCEDURE — 99285 EMERGENCY DEPT VISIT HI MDM: CPT | Mod: 25

## 2018-01-25 PROCEDURE — 96361 HYDRATE IV INFUSION ADD-ON: CPT

## 2018-01-25 PROCEDURE — 25000128 H RX IP 250 OP 636: Performed by: PHYSICIAN ASSISTANT

## 2018-01-25 PROCEDURE — 80048 BASIC METABOLIC PNL TOTAL CA: CPT | Performed by: PHYSICIAN ASSISTANT

## 2018-01-25 PROCEDURE — 76856 US EXAM PELVIC COMPLETE: CPT

## 2018-01-25 PROCEDURE — 86900 BLOOD TYPING SEROLOGIC ABO: CPT | Performed by: PHYSICIAN ASSISTANT

## 2018-01-25 PROCEDURE — 86901 BLOOD TYPING SEROLOGIC RH(D): CPT | Performed by: PHYSICIAN ASSISTANT

## 2018-01-25 PROCEDURE — 86850 RBC ANTIBODY SCREEN: CPT | Performed by: PHYSICIAN ASSISTANT

## 2018-01-25 PROCEDURE — 85025 COMPLETE CBC W/AUTO DIFF WBC: CPT | Performed by: PHYSICIAN ASSISTANT

## 2018-01-25 PROCEDURE — 84702 CHORIONIC GONADOTROPIN TEST: CPT | Performed by: PHYSICIAN ASSISTANT

## 2018-01-25 PROCEDURE — 81001 URINALYSIS AUTO W/SCOPE: CPT | Performed by: PHYSICIAN ASSISTANT

## 2018-01-25 PROCEDURE — 87804 INFLUENZA ASSAY W/OPTIC: CPT | Performed by: PHYSICIAN ASSISTANT

## 2018-01-25 RX ORDER — SODIUM CHLORIDE 9 MG/ML
1000 INJECTION, SOLUTION INTRAVENOUS CONTINUOUS
Status: DISCONTINUED | OUTPATIENT
Start: 2018-01-25 | End: 2018-01-25 | Stop reason: HOSPADM

## 2018-01-25 RX ORDER — IBUPROFEN 600 MG/1
600 TABLET, FILM COATED ORAL ONCE
Status: COMPLETED | OUTPATIENT
Start: 2018-01-25 | End: 2018-01-25

## 2018-01-25 RX ORDER — ACETAMINOPHEN 325 MG/1
975 TABLET ORAL ONCE
Status: COMPLETED | OUTPATIENT
Start: 2018-01-25 | End: 2018-01-25

## 2018-01-25 RX ADMIN — IBUPROFEN 600 MG: 600 TABLET ORAL at 12:54

## 2018-01-25 RX ADMIN — SODIUM CHLORIDE 1000 ML: 9 INJECTION, SOLUTION INTRAVENOUS at 14:35

## 2018-01-25 RX ADMIN — ACETAMINOPHEN 975 MG: 325 TABLET, FILM COATED ORAL at 17:41

## 2018-01-25 ASSESSMENT — ENCOUNTER SYMPTOMS
VOMITING: 1
FEVER: 1
FREQUENCY: 0
BACK PAIN: 1
COUGH: 1
ABDOMINAL PAIN: 0
DYSURIA: 0
RHINORRHEA: 1
SHORTNESS OF BREATH: 1
ARTHRALGIAS: 1
MYALGIAS: 1

## 2018-01-25 NOTE — ED PROVIDER NOTES
History     Chief Complaint:  Multiple Complaints    HPI   Gutierrez Mar is a 27 year old female who presents with multiple complaints. Patient reports miscarriage in early December at approximately 5-6 weeks. She has had persistent bleeding since but this increased starting yesterday to 2 pads every 3 hours (7 in last day). She denies chance of pregnancy and is unsure if this could be the start of her menstrual period as she has lost track of the timing given persistent bleeding.     She also complains of shortness of breath, cough, fever, rhinorrhea, and body aches starting yesterday. Fever measured 102 yesterday and 103 today. Cough has been unproductive but she has had post-tussive emesis. 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     Additionally, patient complains of lower back pain. She denies hx of asthma, abdominal cramping, vaginal discharge, dysuria, urinary frequency or urgency, or other complaint.     Allergies:  Keflex [Cephalexin]  Oxycodone     Medications:    Valacyclovir  Clindamycin  Melatonin    Past Medical History:    C. Difficile diarrhea  Sickle cell trait  Anemia  Metabolic syndrome X    Past Surgical History:    Cosmetic surgery  Mammoplasty augmentation (08/08/2016)    Family History:    History reviewed. No pertinent family history.    Social History:  Smoking Status: Never Smoker  Smokeless Tobacco: Never Used  Alcohol Use: Negative  Marital Status:  Single     Review of Systems   Constitutional: Positive for fever.   HENT: Positive for rhinorrhea.    Respiratory: Positive for cough and shortness of breath.    Gastrointestinal: Positive for vomiting (post-tussive). Negative for abdominal pain.   Genitourinary: Positive for vaginal bleeding. Negative for dysuria, frequency, urgency and vaginal discharge.   Musculoskeletal: Positive for arthralgias, back pain and myalgias.   All other systems reviewed and are negative.    Physical Exam     Patient Vitals for  the past 24 hrs:   BP Temp Temp src Heart Rate Resp SpO2   01/25/18 1724 108/56 - - - - 100 %   01/25/18 1720 - - - - - 100 %   01/25/18 1500 116/69 - - - - -   01/25/18 1450 - - - - - 99 %   01/25/18 1442 - 98.8  F (37.1  C) Oral - - -   01/25/18 1423 - - - - - 97 %   01/25/18 1243 126/78 102.9  F (39.4  C) Oral 99 24 100 %     Physical Exam  Physical Exam   General: Resting on the bed.  Ears, Nose, Throat:  External ears normal.  Nose normal.  No pharyngeal erythema, swelling or exudate.  Midline uvula.    Eyes:  Conjunctivae clear.  Pupils are equal, round, and reactive.   Neck: Normal range of motion.  Neck supple.   CV: Regular rate and rhythm.  No murmurs.      Respiratory: Effort normal and breath sounds normal.  No wheezing or crackles.   Gastrointestinal: Soft.  No distension. There is no tenderness.  There is no rigidity, no rebound and no guarding.   Neuro: Alert. Moving all extremities appropriately.  Normal speech.    Skin: Skin is warm and dry.  No rash noted.   PELVIC: small amount of blood in the vaginal vault, no CMT or adnexal tenderness    Emergency Department Course   Imaging:  Radiographic findings were communicated with the patient who voiced understanding of the findings.    XR Chest, 2 views:  IMPRESSION: Negative.    US Pelvic complete with transvaginal & Abd/Pel Duplex Limited:  IMPRESSION: No acute pathology identified.    Imaging independently reviewed and agree with radiologist interpretation.     Laboratory:  CBC: HGB 11.3 (L) ow WNL (WBC 5.2, )   BMP: Calcium 7.9 (L) ow WNL (Creatinine 0.89)   HCG quant: <1  ABO/Rh type and screen: A+, antibody negative     UA: Blood small, o/w Negative     Influenza A/B antigen: A Negative, B Negative     Interventions:  1254: Ibuprofen 600 mg PO     1435: NS 1L IV Bolus    1741: Tylenol 975 mg PO     Emergency Department Course:  Past medical records, nursing notes, and vitals reviewed.  IV inserted and blood drawn.   Above interventions  provided.   1557: I performed an exam of the patient and obtained history, as documented above.  The patient had a pelvic exam performed here in the emergency department, which she tolerated without difficulty. This was done in the presence of a female chaperone.   The patient was sent for  XR & US while in the emergency department, findings above.   I personally reviewed the laboratory results with the Patient and answered all related questions prior to discharge.    1740: I rechecked the patient. Findings and plan explained to the Patient. Patient discharged home with instructions regarding supportive care, medications, and reasons to return. The importance of close follow-up was reviewed.      Impression & Plan    Medical Decision Making:  Gutierrez Mar is a 27 year old female, otherwise healthy, presenting with numerous complaints including vaginal bleeding and flu like symptoms. Vital signs unremarkable. Broad differential pursued including, but not limited to, influenza, electrolyte or metabolic derangement, dehydration, UTI, pneumonia, pregnancy, or retained products of conception, etc. Overall patient appears nontoxic. She has very minimal bleeding on her vaginal exam. Urinalysis is clean without evidence of infection. Her hemoglobin is within her normal range at 11.3. No leukocytosis. No evidence of acute blood loss anemia. Platelets are normal. BMP without acute electrolyte, metabolic, or renal abnormality. HCG level is negative, she is not pregnant. Pelvic US was obtained, there was no evidence of acute pathology. No signs of retained products of conception. This patient is likely on her menstrual cycle. She has had an abnormal menstrual cycle secondary to recent miscarriage which is likely the cause of her abnormal cycle.  Chest x-ray negative for evidence of pneumonia. Patient is clear to auscultation bilaterally. I suspect she has influenza like illness versus viral illness. She feels well and  improved.  Advised follow up closely with primary care doctor. Advised return precautions for vaginal bleeding that I feel at this time is from her menstrual cycle. Patient discharged in stable condition.     Diagnosis:    ICD-10-CM    1. Flu-like symptoms R68.89    2. Vaginal bleeding N93.9    3. Irregular menstrual cycle N92.6        Disposition:  Discharged to home with plan as outlined.    I, Veto Haas, am serving as a scribe at 3:57 PM on 1/25/2018 to document services personally performed by Tasha Reed MD based on my observations and the provider's statements to me.    1/25/2018   [facility] EMERGENCY DEPARTMENT       Tasha Reed MD  01/25/18 1204

## 2018-01-25 NOTE — ED AVS SNAPSHOT
[facility] Emergency Department    [address] [place] [address]    Phone:  [phone]    Fax:  [phone]                                       Gutierrez Mar   MRN: [id number]    Department:  [facility] Emergency Department   Date of Visit:  1/25/2018           Patient Information     Date Of Birth          1990        Your diagnoses for this visit were:     Flu-like symptoms     Vaginal bleeding     Irregular menstrual cycle        You were seen by Tasha Reed MD.      Follow-up Information     Follow up with Clinic, [facility]. Schedule an appointment as soon as possible for a visit in 2 days.    Contact information:    [address] [facility] [id number] [phone]          Discharge Instructions       Please return to the ED if you have worsening cough, fevers >101, chest pain, shortness of breath, intractable vomiting, or other acute changes.  Please follow up with your PCP in the next 2-3 days.      Use tylenol and ibuprofen for pain.  Drink plenty of fluids and rest.      Please see your PCP in 2-3 days.  Return to the ED if notice increasing bleeding, bleeding through >1 pad per hour for more than 1 hour, lightheaded or dizzy, abdominal pain or other acute changes.    Discharge Instructions  Influenza    You were diagnosed today with influenza or influenza like illness.  Influenza is a respiratory (breathing) illness caused by influenza A or B viruses.  Influenza causes five primary symptoms: fever, headache, muscle aches/fatigue/malaise, sore throat and cough.  These symptoms start one to four days after you have been around a person with this illness. Influenza is spread through sneezing and coughing and can live on surfaces for several days.  It is usually contagious for 5 days but in some cases up to 10 days and often affects several family members. If you have a family member who is less than 2 years  old, older than 65 years old, pregnant or has a serious medical condition, they should be seen right away by a provider to decide if they should take preventative medications. Although influenza will make you feel very ill, most patients don t require any specific treatment. An antiviral medication might be prescribed for certain groups of patients (older patients, younger patients, and those with certain chronic medical problems).    Generally, every Emergency Department visit should have a follow-up clinic visit with either a primary or a specialty clinic/provider. Please follow-up as instructed by your emergency provider today.    Return to the Emergency Department if:    You have trouble breathing.    You develop pain in your chest.    You have signs of being dehydrated, such as dizziness or unable to urinate (pee) at least three times daily.    You are confused or severely weak.    You cannot stop vomiting (throwing up) or you cannot drink enough fluids.    In children, you should seek help if the child has any of the above or if child:    Has blue or purplish skin color.    Is so irritable that he or she does not want to be held.    Does not have tears when crying (in infants) or does not urinate at least three times daily.    Does not wake up easily.    What can I do to help myself?    Rest.    Fluids -- Drink hydrating solutions such as Gatorade  or Pedialyte  as often as you can. If you are drinking enough, you should pass urine at least every eight hours.    Tylenol  (acetaminophen) and Advil  (ibuprofen) can relieve fever, headache, and muscle aches. Do not give aspirin to children under 18 years old.     Antiviral treatment -- Antiviral medicines do not make the flu symptoms go away immediately.  They have only been shown to make the symptoms go away 12 to 24 hours sooner than they would without treatment.       Antibiotics -- Antibiotics are NOT useful for treating viral illnesses such as influenza.  Antibiotics should only be used if there is a bacterial complication of the flu such as bacterial pneumonia, ear infection, or sinusitis.    Because you were diagnosed with a flu like illness you are very contagious.  This means you cannot work, attend school or  for at least 24 hours or until you no longer have a fever.  If you were given a prescription for medicine here today, be sure to read all of the information (including the package insert) that comes with your prescription.  This will include important information about the medicine, its side effects, and any warnings that you need to know about.  The pharmacist who fills the prescription can provide more information and answer questions you may have about the medicine.  If you have questions or concerns that the pharmacist cannot address, please call or return to the Emergency Department.   Remember that you can always come back to the Emergency Department if you are not able to see your regular provider in the amount of time listed above, if you get any new symptoms, or if there is anything that worries you.    Discharge Instructions  Bronchitis, Pneumonia, Bronchospasm    You were seen today for a chest infection or inflammation. If your provider decided this was due to a bacterial infection, you may need an antibiotic. Sometimes these are caused by a virus, and then an antibiotic will not help.     Generally, every Emergency Department visit should have a follow-up clinic visit with either a primary or a specialty clinic/provider. Please follow-up as instructed by your emergency provider today.    Return to the Emergency Department if:    Your breathing gets much worse.    You are very weak, or feel much more ill.    You develop new symptoms, such as chest pain.    You cough up blood.    You are vomiting (throwing up) enough that you cannot keep fluids or your medicine down.    What can I do to help myself?    Fill any prescriptions the provider  gave you and take them right away--especially antibiotics. Be sure to finish the whole antibiotic prescription.    You may be given a prescription for an inhaler, which can help loosen tight air passages.  Use this as needed, but not more often than directed. Inhalers work much better when used with a spacer.     You may be given a prescription for a steroid to reduce inflammation. Used long-term, these can have side effects, but for short-term use they are safe. You may notice restlessness or increased appetite.        You may use non-prescription cough or cold medicines. Cough medicines may help, but don t make the cough go away completely.     Avoid smoke, because this can make your symptoms worse. If you smoke, this may be a good time to quit! Consider using nicotine lozenges, gum, or patches to reduce cravings.     If you have a fever, Tylenol  (acetaminophen), Motrin  (ibuprofen), or Advil  (ibuprofen) may help bring fever down and may help you feel more comfortable. Be sure to read and follow the package directions, and ask your provider if you have questions.    Be sure to get your flu shot each year.  For certain ages, the pneumonia shot can help prevent pneumonia.  If you were given a prescription for medicine here today, be sure to read all of the information (including the package insert) that comes with your prescription.  This will include important information about the medicine, its side effects, and any warnings that you need to know about.  The pharmacist who fills the prescription can provide more information and answer questions you may have about the medicine.  If you have questions or concerns that the pharmacist cannot address, please call or return to the Emergency Department.     Remember that you can always come back to the Emergency Department if you are not able to see your regular provider in the amount of time listed above, if you get any new symptoms, or if there is anything that  worries you.        24 Hour Appointment Hotline       To make an appointment at any [facility], call [phone] ([phone]). If you don't have a family doctor or clinic, we will help you find one. [facility] are conveniently located to serve the needs of you and your family.             Review of your medicines      Our records show that you are taking the medicines listed below. If these are incorrect, please call your family doctor or clinic.        Dose / Directions Last dose taken    Calcium Carb-Cholecalciferol 1000-800 MG-UNIT Tabs   Commonly known as:  CALCIUM 1000 + D   Dose:  1 tablet   Quantity:  100 tablet        Take 1 tablet by mouth daily TAKE WITH FOOD, FOR BONE HEALTH AND FOR VITAMIN D SUPPLEMENTATION   Refills:  PRN        cholecalciferol 22963 UNITS capsule   Commonly known as:  VITAMIN D3   Quantity:  40 capsule        1 CAP 2 X A WK FOR 2.5 WKS, THEN 1 CAP 1ST AND 15TH OF EACH MONTH, FOR VIT D DEFICIENCY   Refills:  0        clindamycin 300 MG capsule   Commonly known as:  CLEOCIN   Dose:  300 mg   Quantity:  30 capsule        Take 1 capsule (300 mg) by mouth 3 times daily   Refills:  0        MELATONIN PO        Refills:  0        valACYclovir 1000 mg tablet   Commonly known as:  VALTREX   Dose:  1000 mg   Quantity:  21 tablet        Take 1 tablet (1,000 mg) by mouth 3 times daily for 7 days   Refills:  0                Procedures and tests performed during your visit     ABO/Rh type and screen    Basic metabolic panel    CBC with platelets differential    HCG quantitative pregnancy    Influenza A/B antigen    UA with Microscopic    US Pelvic Complete w Transvaginal & Abd/Pel Duplex Limited    XR Chest 2 Views      Orders Needing Specimen Collection     None      Pending Results     No orders found from 1/23/2018 to 1/26/2018.            Pending Culture Results     No orders found from 1/23/2018 to 1/26/2018.            Pending Results Instructions     If you had any lab  results that were not finalized at the time of your Discharge, you can call the ED Lab Result RN at [phone]. You will be contacted by this team for any positive Lab results or changes in treatment. The nurses are available 7 days a week from 10A to 6:30P.  You can leave a message 24 hours per day and they will return your call.        Test Results From Your Hospital Stay        1/25/2018  1:32 PM      Component Results     Component Value Ref Range & Units Status    Influenza A/B Agn Specimen Nasal  Final    Influenza A Negative NEG^Negative Final    Influenza B Negative NEG^Negative Final    Test results must be correlated with clinical data. If necessary, results   should be confirmed by a molecular assay or viral culture.           1/25/2018  2:46 PM      Component Results     Component Value Ref Range & Units Status    WBC 5.2 4.0 - 11.0 10e9/L Final    RBC Count 5.10 3.8 - 5.2 10e12/L Final    Hemoglobin 11.3 (L) 11.7 - 15.7 g/dL Final    Hematocrit 36.1 35.0 - 47.0 % Final    MCV 71 (L) 78 - 100 fl Final    MCH 22.2 (L) 26.5 - 33.0 pg Final    MCHC 31.3 (L) 31.5 - 36.5 g/dL Final    RDW 15.1 (H) 10.0 - 15.0 % Final    Platelet Count 197 150 - 450 10e9/L Final    Diff Method Automated Method  Final    % Neutrophils 69.2 % Final    % Lymphocytes 20.7 % Final    % Monocytes 9.3 % Final    % Eosinophils 0.2 % Final    % Basophils 0.2 % Final    % Immature Granulocytes 0.4 % Final    Nucleated RBCs 0 0 /100 Final    Absolute Neutrophil 3.6 1.6 - 8.3 10e9/L Final    Absolute Lymphocytes 1.1 0.8 - 5.3 10e9/L Final    Absolute Monocytes 0.5 0.0 - 1.3 10e9/L Final    Absolute Eosinophils 0.0 0.0 - 0.7 10e9/L Final    Absolute Basophils 0.0 0.0 - 0.2 10e9/L Final    Abs Immature Granulocytes 0.0 0 - 0.4 10e9/L Final    Absolute Nucleated RBC 0.0  Final         1/25/2018  3:02 PM      Component Results     Component Value Ref Range & Units Status    Sodium 135 133 - 144 mmol/L Final    Potassium 3.5 3.4 - 5.3 mmol/L  Final    Chloride 104 94 - 109 mmol/L Final    Carbon Dioxide 23 20 - 32 mmol/L Final    Anion Gap 8 3 - 14 mmol/L Final    Glucose 89 70 - 99 mg/dL Final    Urea Nitrogen 7 7 - 30 mg/dL Final    Creatinine 0.89 0.52 - 1.04 mg/dL Final    GFR Estimate 77 >60 mL/min/1.7m2 Final    Non  GFR Calc    GFR Estimate If Black >90 >60 mL/min/1.7m2 Final    African American GFR Calc    Calcium 7.9 (L) 8.5 - 10.1 mg/dL Final         1/25/2018  3:42 PM      Component Results     Component Value Ref Range & Units Status    HCG Quantitative Serum <1 0 - 5 IU/L Final    Results confirmed by repeat test         1/25/2018  3:59 PM      Component Results     Component Value Ref Range & Units Status    Color Urine Yellow  Final    Appearance Urine Clear  Final    Glucose Urine Negative NEG^Negative mg/dL Final    Bilirubin Urine Negative NEG^Negative Final    Ketones Urine Negative NEG^Negative mg/dL Final    Specific Gravity Urine 1.005 1.003 - 1.035 Final    Blood Urine Small (A) NEG^Negative Final    pH Urine 6.0 5.0 - 7.0 pH Final    Protein Albumin Urine Negative NEG^Negative mg/dL Final    Urobilinogen mg/dL 0.0 0.0 - 2.0 mg/dL Final    Nitrite Urine Negative NEG^Negative Final    Leukocyte Esterase Urine Negative NEG^Negative Final    Source Midstream Urine  Final    WBC Urine <1 0 - 2 /HPF Final    RBC Urine 1 0 - 2 /HPF Final    Squamous Epithelial /HPF Urine 1 0 - 1 /HPF Final         1/25/2018  3:29 PM      Component Results     Component Value Ref Range & Units Status    ABO A  Final    RH(D) Pos  Final    Antibody Screen Neg  Final    Test Valid Only At [facility]     Final    Specimen Expires 01/28/2018  Final         1/25/2018  5:41 PM      Narrative     CHEST TWO VIEWS   1/25/2018 5:18 PM     HISTORY: Cough.     COMPARISON: 8/25/2016.    FINDINGS: Negative. No interval change.        Impression     IMPRESSION: Negative.    AMY EL MD         1/25/2018  5:22 PM      Narrative      ULTRASOUND PELVIS COMPLETE WITH TRANSVAGINAL AND DOPPLER LIMITED  1/25/2018 5:04 PM     HISTORY: Pelvic pain.     TECHNIQUE: Transabdominal and transvaginal imaging was performed.  Transvaginal exam performed to better evaluate: Uterus, ovaries,  adnexa.    Comparison 12/16/2017    FINDINGS: Ovary is unremarkable. Doppler waveform analysis shows blood  flow within both ovaries. Endometrial thickness 0.4 cm.        Impression     IMPRESSION: No acute pathology identified.    GAIL CARLSON MD                Clinical Quality Measure: Blood Pressure Screening     Your blood pressure was checked while you were in the emergency department today. The last reading we obtained was  BP: 108/56 . Please read the guidelines below about what these numbers mean and what you should do about them.  If your systolic blood pressure (the top number) is less than 120 and your diastolic blood pressure (the bottom number) is less than 80, then your blood pressure is normal. There is nothing more that you need to do about it.  If your systolic blood pressure (the top number) is 120-139 or your diastolic blood pressure (the bottom number) is 80-89, your blood pressure may be higher than it should be. You should have your blood pressure rechecked within a year by a primary care provider.  If your systolic blood pressure (the top number) is 140 or greater or your diastolic blood pressure (the bottom number) is 90 or greater, you may have high blood pressure. High blood pressure is treatable, but if left untreated over time it can put you at risk for heart attack, stroke, or kidney failure. You should have your blood pressure rechecked by a primary care provider within the next 4 weeks.  If your provider in the emergency department today gave you specific instructions to follow-up with your doctor or provider even sooner than that, you should follow that instruction and not wait for up to 4 weeks for your follow-up visit.        Thank you for  choosing [facility]       Thank you for choosing [facility] for your care. Our goal is always to provide you with excellent care. Hearing back from our patients is one way we can continue to improve our services. Please take a few minutes to complete the written survey that you may receive in the mail after you visit with us. Thank you!        [facility] Information     [facility] gives you secure access to your electronic health record. If you see a primary care provider, you can also send messages to your care team and make appointments. If you have questions, please call your primary care clinic.  If you do not have a primary care provider, please call [phone] and they will assist you.        Care EveryWhere ID     This is your Care EveryWhere ID. This could be used by other organizations to access your [facility] medical records  [id number]        Equal Access to Services     CAROL GAYLE : Naida Brown, patsy liang, yogesh meyers, porfirio bowen. So [facility] [phone].    ATENCIÓN: Si habla español, tiene a conley disposición servicios gratuitos de asistencia lingüística. Llame al [phone].    We comply with applicable federal civil rights laws and [place] laws. We do not discriminate on the basis of race, color, national origin, age, disability, sex, sexual orientation, or gender identity.            After Visit Summary       This is your record. Keep this with you and show to your community pharmacist(s) and doctor(s) at your next visit.

## 2018-01-25 NOTE — ED NOTES
Patient presents with multiple complaints. Patient started suddenly having shortness of breath, fever, cough, and body aches that started yesterday. Patient also had a miscarriage in December at around 5-6 weeks and has hand increased vaginal bleeding, soaking 1 pad every hour. Pain in lower back. ABCDs intact,alert and oriented x 4.

## 2018-01-25 NOTE — ED AVS SNAPSHOT
[facility] Emergency Department    [address]    [facility] [address]    Phone:  [phone]    Fax:  [phone]                                       Gutierrez Mar   MRN: [id number]    Department:  [facility] Emergency Department   Date of Visit:  1/25/2018           After Visit Summary Signature Page     I have received my discharge instructions, and my questions have been answered. I have discussed any challenges I see with this plan with the nurse or doctor.    ..........................................................................................................................................  Patient/Patient Representative Signature      ..........................................................................................................................................  Patient Representative Print Name and Relationship to Patient    ..................................................               ................................................  Date                                            Time    ..........................................................................................................................................  Reviewed by Signature/Title    ...................................................              ..............................................  Date                                                            Time

## 2018-01-25 NOTE — DISCHARGE INSTRUCTIONS
Please return to the ED if you have worsening cough, fevers >101, chest pain, shortness of breath, intractable vomiting, or other acute changes.  Please follow up with your PCP in the next 2-3 days.      Use tylenol and ibuprofen for pain.  Drink plenty of fluids and rest.      Please see your PCP in 2-3 days.  Return to the ED if notice increasing bleeding, bleeding through >1 pad per hour for more than 1 hour, lightheaded or dizzy, abdominal pain or other acute changes.    Discharge Instructions  Influenza    You were diagnosed today with influenza or influenza like illness.  Influenza is a respiratory (breathing) illness caused by influenza A or B viruses.  Influenza causes five primary symptoms: fever, headache, muscle aches/fatigue/malaise, sore throat and cough.  These symptoms start one to four days after you have been around a person with this illness. Influenza is spread through sneezing and coughing and can live on surfaces for several days.  It is usually contagious for 5 days but in some cases up to 10 days and often affects several family members. If you have a family member who is less than 2 years old, older than 65 years old, pregnant or has a serious medical condition, they should be seen right away by a provider to decide if they should take preventative medications. Although influenza will make you feel very ill, most patients don t require any specific treatment. An antiviral medication might be prescribed for certain groups of patients (older patients, younger patients, and those with certain chronic medical problems).    Generally, every Emergency Department visit should have a follow-up clinic visit with either a primary or a specialty clinic/provider. Please follow-up as instructed by your emergency provider today.    Return to the Emergency Department if:    You have trouble breathing.    You develop pain in your chest.    You have signs of being dehydrated, such as dizziness or unable to  urinate (pee) at least three times daily.    You are confused or severely weak.    You cannot stop vomiting (throwing up) or you cannot drink enough fluids.    In children, you should seek help if the child has any of the above or if child:    Has blue or purplish skin color.    Is so irritable that he or she does not want to be held.    Does not have tears when crying (in infants) or does not urinate at least three times daily.    Does not wake up easily.    What can I do to help myself?    Rest.    Fluids -- Drink hydrating solutions such as Gatorade  or Pedialyte  as often as you can. If you are drinking enough, you should pass urine at least every eight hours.    Tylenol  (acetaminophen) and Advil  (ibuprofen) can relieve fever, headache, and muscle aches. Do not give aspirin to children under 18 years old.     Antiviral treatment -- Antiviral medicines do not make the flu symptoms go away immediately.  They have only been shown to make the symptoms go away 12 to 24 hours sooner than they would without treatment.       Antibiotics -- Antibiotics are NOT useful for treating viral illnesses such as influenza. Antibiotics should only be used if there is a bacterial complication of the flu such as bacterial pneumonia, ear infection, or sinusitis.    Because you were diagnosed with a flu like illness you are very contagious.  This means you cannot work, attend school or  for at least 24 hours or until you no longer have a fever.  If you were given a prescription for medicine here today, be sure to read all of the information (including the package insert) that comes with your prescription.  This will include important information about the medicine, its side effects, and any warnings that you need to know about.  The pharmacist who fills the prescription can provide more information and answer questions you may have about the medicine.  If you have questions or concerns that the pharmacist cannot address,  please call or return to the Emergency Department.   Remember that you can always come back to the Emergency Department if you are not able to see your regular provider in the amount of time listed above, if you get any new symptoms, or if there is anything that worries you.    Discharge Instructions  Bronchitis, Pneumonia, Bronchospasm    You were seen today for a chest infection or inflammation. If your provider decided this was due to a bacterial infection, you may need an antibiotic. Sometimes these are caused by a virus, and then an antibiotic will not help.     Generally, every Emergency Department visit should have a follow-up clinic visit with either a primary or a specialty clinic/provider. Please follow-up as instructed by your emergency provider today.    Return to the Emergency Department if:    Your breathing gets much worse.    You are very weak, or feel much more ill.    You develop new symptoms, such as chest pain.    You cough up blood.    You are vomiting (throwing up) enough that you cannot keep fluids or your medicine down.    What can I do to help myself?    Fill any prescriptions the provider gave you and take them right away--especially antibiotics. Be sure to finish the whole antibiotic prescription.    You may be given a prescription for an inhaler, which can help loosen tight air passages.  Use this as needed, but not more often than directed. Inhalers work much better when used with a spacer.     You may be given a prescription for a steroid to reduce inflammation. Used long-term, these can have side effects, but for short-term use they are safe. You may notice restlessness or increased appetite.        You may use non-prescription cough or cold medicines. Cough medicines may help, but don t make the cough go away completely.     Avoid smoke, because this can make your symptoms worse. If you smoke, this may be a good time to quit! Consider using nicotine lozenges, gum, or patches to reduce  cravings.     If you have a fever, Tylenol  (acetaminophen), Motrin  (ibuprofen), or Advil  (ibuprofen) may help bring fever down and may help you feel more comfortable. Be sure to read and follow the package directions, and ask your provider if you have questions.    Be sure to get your flu shot each year.  For certain ages, the pneumonia shot can help prevent pneumonia.  If you were given a prescription for medicine here today, be sure to read all of the information (including the package insert) that comes with your prescription.  This will include important information about the medicine, its side effects, and any warnings that you need to know about.  The pharmacist who fills the prescription can provide more information and answer questions you may have about the medicine.  If you have questions or concerns that the pharmacist cannot address, please call or return to the Emergency Department.     Remember that you can always come back to the Emergency Department if you are not able to see your regular provider in the amount of time listed above, if you get any new symptoms, or if there is anything that worries you.

## 2018-05-17 ENCOUNTER — HOSPITAL ENCOUNTER (EMERGENCY)
Facility: CLINIC | Age: 28
Discharge: HOME | End: 2018-05-17
Attending: EMERGENCY MEDICINE | Admitting: EMERGENCY MEDICINE
Payer: OTHER MISCELLANEOUS

## 2018-05-17 ENCOUNTER — APPOINTMENT (OUTPATIENT)
Dept: GENERAL RADIOLOGY | Facility: CLINIC | Age: 28
End: 2018-05-17
Attending: EMERGENCY MEDICINE
Payer: OTHER MISCELLANEOUS

## 2018-05-17 ENCOUNTER — APPOINTMENT (OUTPATIENT)
Dept: CT IMAGING | Facility: CLINIC | Age: 28
End: 2018-05-17
Attending: EMERGENCY MEDICINE
Payer: OTHER MISCELLANEOUS

## 2018-05-17 VITALS
TEMPERATURE: 98.1 F | HEART RATE: 102 BPM | BODY MASS INDEX: 31.83 KG/M2 | WEIGHT: 173 LBS | HEIGHT: 62 IN | OXYGEN SATURATION: 100 % | SYSTOLIC BLOOD PRESSURE: 120 MMHG | RESPIRATION RATE: 16 BRPM | DIASTOLIC BLOOD PRESSURE: 69 MMHG

## 2018-05-17 DIAGNOSIS — S09.90XA TRAUMATIC INJURY OF HEAD, INITIAL ENCOUNTER: ICD-10-CM

## 2018-05-17 DIAGNOSIS — S06.0X0A CONCUSSION WITHOUT LOSS OF CONSCIOUSNESS, INITIAL ENCOUNTER: ICD-10-CM
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
## 2018-05-17 PROCEDURE — 12001 RPR S/N/AX/GEN/TRNK 2.5CM/<: CPT | Mod: F2

## 2018-05-17 PROCEDURE — 99284 EMERGENCY DEPT VISIT MOD MDM: CPT | Mod: 25

## 2018-05-17 PROCEDURE — 70450 CT HEAD/BRAIN W/O DYE: CPT

## 2018-05-17 PROCEDURE — 25000132 ZZH RX MED GY IP 250 OP 250 PS 637: Performed by: EMERGENCY MEDICINE

## 2018-05-17 PROCEDURE — 73140 X-RAY EXAM OF FINGER(S): CPT | Mod: LT

## 2018-05-17 RX ORDER — LIDOCAINE HYDROCHLORIDE AND EPINEPHRINE 10; 10 MG/ML; UG/ML
5 INJECTION, SOLUTION INFILTRATION; PERINEURAL ONCE
Status: DISCONTINUED | OUTPATIENT
Start: 2018-05-17 | End: 2018-05-17 | Stop reason: HOSPADM

## 2018-05-17 RX ORDER — ACETAMINOPHEN 325 MG/1
650 TABLET ORAL ONCE
Status: COMPLETED | OUTPATIENT
Start: 2018-05-17 | End: 2018-05-17

## 2018-05-17 RX ADMIN — ACETAMINOPHEN 650 MG: 325 TABLET ORAL at 10:49

## 2018-05-17 RX ADMIN — AMOXICILLIN AND CLAVULANATE POTASSIUM 1 TABLET: 875; 125 TABLET, FILM COATED ORAL at 10:49

## 2018-05-17 ASSESSMENT — ENCOUNTER SYMPTOMS
NUMBNESS: 1
WOUND: 1
HEADACHES: 1
DIZZINESS: 1
ARTHRALGIAS: 1
MYALGIAS: 1

## 2018-05-17 NOTE — LETTER
May 17, 2018      To Whom It May Concern:      Gutierrez Mar was seen in our Emergency Department today, 05/17/18.  I expect her condition to improve over the next 2-3 days.  She may return to school 5/20/18.    Sincerely,        Brooklyn Bergeron RN
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
## 2018-05-17 NOTE — ED AVS SNAPSHOT
[facility] Emergency Department    [address]    [facility] [address]    Phone:  [phone]    Fax:  [phone]                                       Gutierrez Mar   MRN: [id number]    Department:  [facility] Emergency Department   Date of Visit:  5/17/2018           After Visit Summary Signature Page     I have received my discharge instructions, and my questions have been answered. I have discussed any challenges I see with this plan with the nurse or doctor.    ..........................................................................................................................................  Patient/Patient Representative Signature      ..........................................................................................................................................  Patient Representative Print Name and Relationship to Patient    ..................................................               ................................................  Date                                            Time    ..........................................................................................................................................  Reviewed by Signature/Title    ...................................................              ..............................................  Date                                                            Time

## 2018-05-17 NOTE — ED NOTES
Pt left prescription in her room.  Called and left message for the pt on the phone number listed in the demographics.  Prescription left at the  with her name on it.
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
## 2018-05-17 NOTE — ED NOTES
Bed: ED12  Expected date: 5/17/18  Expected time: 9:19 AM  Means of arrival: Ambulance  Comments:  Eduardo Martinez
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
## 2019-08-16 ENCOUNTER — OFFICE VISIT (OUTPATIENT)
Dept: URGENT CARE | Facility: URGENT CARE | Age: 29
End: 2019-08-16
Payer: COMMERCIAL

## 2019-08-16 VITALS
HEART RATE: 75 BPM | OXYGEN SATURATION: 98 % | DIASTOLIC BLOOD PRESSURE: 84 MMHG | SYSTOLIC BLOOD PRESSURE: 124 MMHG | BODY MASS INDEX: 33.01 KG/M2 | TEMPERATURE: 97.7 F | WEIGHT: 180.5 LBS

## 2019-08-16 DIAGNOSIS — R21 RASH: ICD-10-CM

## 2019-08-16 DIAGNOSIS — L73.9 HAIR FOLLICLE INFECTION: ICD-10-CM

## 2019-08-16 DIAGNOSIS — R30.0 DYSURIA: Primary | ICD-10-CM

## 2019-08-16 LAB
ALBUMIN UR-MCNC: NEGATIVE MG/DL
APPEARANCE UR: ABNORMAL
BACTERIA #/AREA URNS HPF: ABNORMAL /HPF
BILIRUB UR QL STRIP: NEGATIVE
COLOR UR AUTO: YELLOW
GLUCOSE UR STRIP-MCNC: NEGATIVE MG/DL
HGB UR QL STRIP: ABNORMAL
KETONES UR STRIP-MCNC: NEGATIVE MG/DL
LEUKOCYTE ESTERASE UR QL STRIP: NEGATIVE
NITRATE UR QL: NEGATIVE
NON-SQ EPI CELLS #/AREA URNS LPF: ABNORMAL /LPF
PH UR STRIP: 5.5 PH (ref 5–7)
RBC #/AREA URNS AUTO: ABNORMAL /HPF
SOURCE: ABNORMAL
SP GR UR STRIP: 1.02 (ref 1–1.03)
SPECIMEN SOURCE: NORMAL
UROBILINOGEN UR STRIP-ACNC: 0.2 EU/DL (ref 0.2–1)
WBC #/AREA URNS AUTO: ABNORMAL /HPF
WET PREP SPEC: NORMAL

## 2019-08-16 PROCEDURE — 81001 URINALYSIS AUTO W/SCOPE: CPT | Performed by: FAMILY MEDICINE

## 2019-08-16 PROCEDURE — 87086 URINE CULTURE/COLONY COUNT: CPT | Performed by: FAMILY MEDICINE

## 2019-08-16 PROCEDURE — 87210 SMEAR WET MOUNT SALINE/INK: CPT | Performed by: FAMILY MEDICINE

## 2019-08-16 PROCEDURE — 99213 OFFICE O/P EST LOW 20 MIN: CPT | Performed by: FAMILY MEDICINE

## 2019-08-16 NOTE — PROGRESS NOTES
SUBJECTIVE:  Gutierrez Mar is a 28 year old female who presents with dysuria and vaginal discharge.    Onset of symptoms 2 month(s) ago, stable since.     Pain:none.     Vaginal bleeding: No      Vaginal symptoms: local irritation  No LMP recorded.    Sexually active: yes,  Predisposing factors: h/o BV   Hx of previous symptom: frequent  He also had concerns about a rash over the right side of her face which she thinks could be from shingles.    Past Medical History:   Diagnosis Date     C. difficile diarrhea      Sickle cell trait (H)      Current Outpatient Medications   Medication Sig Dispense Refill     cholecalciferol 2000 units tablet Take 2,000 Units by mouth       Cyanocobalamin (VITAMIN B 12 PO)        MELATONIN PO        Social History     Socioeconomic History     Marital status: Single     Spouse name: Not on file     Number of children: Not on file     Years of education: Not on file     Highest education level: Not on file   Occupational History     Not on file   Social Needs     Financial resource strain: Not on file     Food insecurity:     Worry: Not on file     Inability: Not on file     Transportation needs:     Medical: Not on file     Non-medical: Not on file   Tobacco Use     Smoking status: Never Smoker     Smokeless tobacco: Never Used   Substance and Sexual Activity     Alcohol use: No     Alcohol/week: 0.0 oz     Drug use: No     Sexual activity: Yes     Partners: Male     Birth control/protection: Condom   Lifestyle     Physical activity:     Days per week: Not on file     Minutes per session: Not on file     Stress: Not on file   Relationships     Social connections:     Talks on phone: Not on file     Gets together: Not on file     Attends [employer] service: Not on file     Active member of club or organization: Not on file     Attends meetings of clubs or organizations: Not on file     Relationship status: Not on file     Intimate partner violence:     Fear of current or ex partner:  Not on file     Emotionally abused: Not on file     Physically abused: Not on file     Forced sexual activity: Not on file   Other Topics Concern     Parent/sibling w/ CABG, MI or angioplasty before 65F 55M? Not Asked   Social History Narrative    ** Merged History Encounter **            ROS:   10 point ROS of systems including Constitutional, Eyes, Respiratory, Cardiovascular, Gastroenterology,  Muscularskeletal, Psychiatric were all negative except for pertinent positives noted in my HPI           OBJECTIVE:  /84   Pulse 75   Temp 97.7  F (36.5  C) (Oral)   Wt 81.9 kg (180 lb 8 oz)   SpO2 98%   BMI 33.01 kg/m    deferred  GENERAL APPEARANCE: healthy, alert and no distress  CV: regular rates and rhythm, normal S1 S2, no murmur noted  ABDOMEN:  soft, nontender, no HSM or masses and bowel sounds normal  BACK: No CVA tenderness  SKIN: no suspicious lesions or rashes-1 tiny hair follicle infection noted over the right forehead just below the eyebrow area.  Which looks to me like a hair follicle infection.    ASSESSMENT:  Gutierrez was seen today for urgent care and uti.    Diagnoses and all orders for this visit:    Dysuria  -     UA with Microscopic reflex to Culture  -     Wet prep    Rash    Hair follicle infection      Results for orders placed or performed in visit on 08/16/19   UA with Microscopic reflex to Culture   Result Value Ref Range    Color Urine Yellow     Appearance Urine Slightly Cloudy     Glucose Urine Negative NEG^Negative mg/dL    Bilirubin Urine Negative NEG^Negative    Ketones Urine Negative NEG^Negative mg/dL    Specific Gravity Urine 1.025 1.003 - 1.035    pH Urine 5.5 5.0 - 7.0 pH    Protein Albumin Urine Negative NEG^Negative mg/dL    Urobilinogen Urine 0.2 0.2 - 1.0 EU/dL    Nitrite Urine Negative NEG^Negative    Blood Urine Large (A) NEG^Negative    Leukocyte Esterase Urine Negative NEG^Negative    Source Midstream Urine     WBC Urine 0 - 5 OTO5^0 - 5 /HPF    RBC Urine 25-50 (A)  OTO2^O - 2 /HPF    Squamous Epithelial /LPF Urine Few FEW^Few /LPF    Bacteria Urine Few (A) NEG^Negative /HPF   Wet prep   Result Value Ref Range    Specimen Description Vagina     Wet Prep No Trichomonas seen     Wet Prep No clue cells seen     Wet Prep No yeast seen     Wet Prep No WBC's seen          PLAN:  See orders in epic  To review the results with the patient in details  As there was blood noted and some bacteria noted UC was ordered patient also needed a note for her work that she was seen here today and was given a note  Follow up if  symptoms fail to improve or worsens   Pt understood and agreed with plan   Declined STD testing  Dinorah Leal MD

## 2019-08-16 NOTE — LETTER
[facility]  [facility]    [address] [address]  Phone: [phone]    August 16, 2019      RE: Gutierrez Mar  [address]  [facility] [address]       To whom it may concern:    Gutierrez Mar was seen in our clinic today. She  may return to work with the following: No working or lifting restrictions on or about 08/16/19.    Sincerely,      Dinorah Leal MD

## 2019-08-18 LAB
BACTERIA SPEC CULT: NORMAL
SPECIMEN SOURCE: NORMAL

## 2019-11-08 ENCOUNTER — HEALTH MAINTENANCE LETTER (OUTPATIENT)
Age: 29
End: 2019-11-08

## 2019-11-29 ENCOUNTER — OFFICE VISIT (OUTPATIENT)
Dept: URGENT CARE | Facility: URGENT CARE | Age: 29
End: 2019-11-29
Payer: COMMERCIAL

## 2019-11-29 VITALS
WEIGHT: 177.5 LBS | HEIGHT: 62 IN | SYSTOLIC BLOOD PRESSURE: 118 MMHG | DIASTOLIC BLOOD PRESSURE: 80 MMHG | BODY MASS INDEX: 32.66 KG/M2

## 2019-11-29 DIAGNOSIS — S00.83XA FOREHEAD CONTUSION, INITIAL ENCOUNTER: ICD-10-CM

## 2019-11-29 DIAGNOSIS — R51.9 MILD HEADACHE: ICD-10-CM

## 2019-11-29 DIAGNOSIS — S09.90XA HEAD INJURY, INITIAL ENCOUNTER: Primary | ICD-10-CM

## 2019-11-29 PROCEDURE — 99214 OFFICE O/P EST MOD 30 MIN: CPT | Performed by: FAMILY MEDICINE

## 2019-11-29 ASSESSMENT — MIFFLIN-ST. JEOR: SCORE: 1493.38

## 2019-11-29 NOTE — PROGRESS NOTES
"SUBJECTIVE:  Chief Complaint   Patient presents with     Urgent Care     fell this morning at 8:50am slipped on ice when going to start car states no balance problems no nausea or vomitting no dizziness but does inermitant headaches   .araceli presents with a chief complaint of forehead injury after fall on ice.  The injury occurred hours ago.   The injury happened while while walking.   How: trauma: fell on ice on way to work immediate pain  The patient complained of mild pain and has not had decreased ROM.    Pain exacerbated by palpation    He treated it initially with no therapy.   This is the first time this type of injury has occurred to this patient.   No LOC    Past Medical History:   Diagnosis Date     C. difficile diarrhea      Sickle cell trait (H)      Allergies   Allergen Reactions     Keflet [Cephalexin] GI Disturbance and Diarrhea     Leading to C-diff     Oxycodone Nausea     Percocet ok. Just straight Oxycodone makes her nauseated.      Social History     Tobacco Use     Smoking status: Never Smoker     Smokeless tobacco: Never Used   Substance Use Topics     Alcohol use: No     Alcohol/week: 0.0 standard drinks       ROSINTEGUMENTARY/SKIN: NEGATIVE for open wound/bleeding and NEGATIVE for bruising  MUSCULOSKELETAL: NEGATIVE for joint swelling, paresthesias, radicular pain  NEURO: NEGATIVE for numbness, paresthesias, weakness    EXAM: /80   Ht 1.575 m (5' 2\")   Wt 80.5 kg (177 lb 8 oz)   BMI 32.47 kg/m  Gen: healthy,alert,no distress  Extremity: forehead with small swelling and slight tenderness has.   There is not compromise to the distal circulation.  Pulses are +2 and CRT is brisk.GENERAL APPEARANCE: healthy, alert and no distress  NECK: supple, non-tender to palpation, FROM   EXTREMITIES: peripheral pulses normal  SKIN: no suspicious lesions or rashes  NEURO: Normal strength and tone, sensory exam grossly normal, mentation intact and speech normal  CN 2-12 grossly intact  PERRUFUS, " EOMI    X-RAY was not done.      ICD-10-CM    1. Head injury, initial encounter S09.90XA    2. Mild headache R51    3. Forehead contusion, initial encounter S00.83XA      ED if worse  RICE

## 2019-12-27 ENCOUNTER — HOSPITAL ENCOUNTER (EMERGENCY)
Facility: CLINIC | Age: 29
Discharge: HOME | End: 2019-12-27
Attending: PHYSICIAN ASSISTANT | Admitting: PHYSICIAN ASSISTANT
Payer: COMMERCIAL

## 2019-12-27 ENCOUNTER — APPOINTMENT (OUTPATIENT)
Dept: GENERAL RADIOLOGY | Facility: CLINIC | Age: 29
End: 2019-12-27
Attending: PHYSICIAN ASSISTANT
Payer: COMMERCIAL

## 2019-12-27 VITALS
HEART RATE: 57 BPM | DIASTOLIC BLOOD PRESSURE: 94 MMHG | OXYGEN SATURATION: 100 % | WEIGHT: 183 LBS | SYSTOLIC BLOOD PRESSURE: 130 MMHG | RESPIRATION RATE: 16 BRPM | HEIGHT: 62 IN | TEMPERATURE: 97.6 F | BODY MASS INDEX: 33.68 KG/M2

## 2019-12-27 DIAGNOSIS — B96.89 BACTERIAL VAGINOSIS: ICD-10-CM

## 2019-12-27 DIAGNOSIS — M54.9 BACK PAIN, UNSPECIFIED BACK LOCATION, UNSPECIFIED BACK PAIN LATERALITY, UNSPECIFIED CHRONICITY: ICD-10-CM

## 2019-12-27 DIAGNOSIS — N76.0 BACTERIAL VAGINOSIS: ICD-10-CM

## 2019-12-27 LAB
ALBUMIN UR-MCNC: NEGATIVE MG/DL
ANION GAP SERPL CALCULATED.3IONS-SCNC: 4 MMOL/L (ref 3–14)
APPEARANCE UR: CLEAR
BASOPHILS # BLD AUTO: 0 10E9/L (ref 0–0.2)
BASOPHILS NFR BLD AUTO: 0.2 %
BILIRUB UR QL STRIP: NEGATIVE
BUN SERPL-MCNC: 10 MG/DL (ref 7–30)
CALCIUM SERPL-MCNC: 9 MG/DL (ref 8.5–10.1)
CHLORIDE SERPL-SCNC: 108 MMOL/L (ref 94–109)
CO2 SERPL-SCNC: 28 MMOL/L (ref 20–32)
COLOR UR AUTO: YELLOW
CREAT SERPL-MCNC: 0.84 MG/DL (ref 0.52–1.04)
DIFFERENTIAL METHOD BLD: ABNORMAL
EOSINOPHIL # BLD AUTO: 0.1 10E9/L (ref 0–0.7)
EOSINOPHIL NFR BLD AUTO: 1.3 %
ERYTHROCYTE [DISTWIDTH] IN BLOOD BY AUTOMATED COUNT: 14.3 % (ref 10–15)
GFR SERPL CREATININE-BSD FRML MDRD: >90 ML/MIN/{1.73_M2}
GLUCOSE SERPL-MCNC: 83 MG/DL (ref 70–99)
GLUCOSE UR STRIP-MCNC: NEGATIVE MG/DL
HCG UR QL: NEGATIVE
HCT VFR BLD AUTO: 38.8 % (ref 35–47)
HGB BLD-MCNC: 12.4 G/DL (ref 11.7–15.7)
HGB UR QL STRIP: NEGATIVE
IMM GRANULOCYTES # BLD: 0 10E9/L (ref 0–0.4)
IMM GRANULOCYTES NFR BLD: 0 %
KETONES UR STRIP-MCNC: NEGATIVE MG/DL
LEUKOCYTE ESTERASE UR QL STRIP: NEGATIVE
LYMPHOCYTES # BLD AUTO: 2.3 10E9/L (ref 0.8–5.3)
LYMPHOCYTES NFR BLD AUTO: 51 %
MCH RBC QN AUTO: 22 PG (ref 26.5–33)
MCHC RBC AUTO-ENTMCNC: 32 G/DL (ref 31.5–36.5)
MCV RBC AUTO: 69 FL (ref 78–100)
MONOCYTES # BLD AUTO: 0.3 10E9/L (ref 0–1.3)
MONOCYTES NFR BLD AUTO: 6.8 %
MUCOUS THREADS #/AREA URNS LPF: PRESENT /LPF
NEUTROPHILS # BLD AUTO: 1.9 10E9/L (ref 1.6–8.3)
NEUTROPHILS NFR BLD AUTO: 40.7 %
NITRATE UR QL: NEGATIVE
NRBC # BLD AUTO: 0 10*3/UL
NRBC BLD AUTO-RTO: 0 /100
PH UR STRIP: 6.5 PH (ref 5–7)
PLATELET # BLD AUTO: 245 10E9/L (ref 150–450)
POTASSIUM SERPL-SCNC: 4.1 MMOL/L (ref 3.4–5.3)
RBC # BLD AUTO: 5.64 10E12/L (ref 3.8–5.2)
RBC #/AREA URNS AUTO: <1 /HPF (ref 0–2)
SODIUM SERPL-SCNC: 140 MMOL/L (ref 133–144)
SOURCE: ABNORMAL
SP GR UR STRIP: 1.02 (ref 1–1.03)
SPECIMEN SOURCE: ABNORMAL
SQUAMOUS #/AREA URNS AUTO: 1 /HPF (ref 0–1)
TROPONIN I SERPL-MCNC: <0.015 UG/L (ref 0–0.04)
UROBILINOGEN UR STRIP-MCNC: NORMAL MG/DL (ref 0–2)
WBC # BLD AUTO: 4.6 10E9/L (ref 4–11)
WBC #/AREA URNS AUTO: <1 /HPF (ref 0–5)
WET PREP SPEC: ABNORMAL

## 2019-12-27 PROCEDURE — 36415 COLL VENOUS BLD VENIPUNCTURE: CPT | Performed by: PHYSICIAN ASSISTANT

## 2019-12-27 PROCEDURE — 87210 SMEAR WET MOUNT SALINE/INK: CPT | Performed by: PHYSICIAN ASSISTANT

## 2019-12-27 PROCEDURE — 84484 ASSAY OF TROPONIN QUANT: CPT | Performed by: PHYSICIAN ASSISTANT

## 2019-12-27 PROCEDURE — 99285 EMERGENCY DEPT VISIT HI MDM: CPT | Mod: 25

## 2019-12-27 PROCEDURE — 81001 URINALYSIS AUTO W/SCOPE: CPT | Performed by: PHYSICIAN ASSISTANT

## 2019-12-27 PROCEDURE — 71046 X-RAY EXAM CHEST 2 VIEWS: CPT

## 2019-12-27 PROCEDURE — 81025 URINE PREGNANCY TEST: CPT | Performed by: PHYSICIAN ASSISTANT

## 2019-12-27 PROCEDURE — 93005 ELECTROCARDIOGRAM TRACING: CPT

## 2019-12-27 PROCEDURE — 87491 CHLMYD TRACH DNA AMP PROBE: CPT | Performed by: PHYSICIAN ASSISTANT

## 2019-12-27 PROCEDURE — 85025 COMPLETE CBC W/AUTO DIFF WBC: CPT | Performed by: PHYSICIAN ASSISTANT

## 2019-12-27 PROCEDURE — 80048 BASIC METABOLIC PNL TOTAL CA: CPT | Performed by: PHYSICIAN ASSISTANT

## 2019-12-27 PROCEDURE — 96372 THER/PROPH/DIAG INJ SC/IM: CPT

## 2019-12-27 PROCEDURE — 87591 N.GONORRHOEAE DNA AMP PROB: CPT | Performed by: PHYSICIAN ASSISTANT

## 2019-12-27 PROCEDURE — 25000128 H RX IP 250 OP 636: Performed by: PHYSICIAN ASSISTANT

## 2019-12-27 RX ORDER — KETOROLAC TROMETHAMINE 15 MG/ML
15 INJECTION, SOLUTION INTRAMUSCULAR; INTRAVENOUS ONCE
Status: COMPLETED | OUTPATIENT
Start: 2019-12-27 | End: 2019-12-27

## 2019-12-27 RX ADMIN — KETOROLAC TROMETHAMINE 15 MG: 15 INJECTION, SOLUTION INTRAMUSCULAR; INTRAVENOUS at 14:07

## 2019-12-27 ASSESSMENT — ENCOUNTER SYMPTOMS
DIARRHEA: 0
BACK PAIN: 1
RHINORRHEA: 0
FEVER: 0
FATIGUE: 1
NAUSEA: 0
ABDOMINAL PAIN: 0
VOMITING: 0
COUGH: 0
DYSURIA: 0
LIGHT-HEADEDNESS: 1
CHILLS: 1

## 2019-12-27 ASSESSMENT — MIFFLIN-ST. JEOR: SCORE: 1518.33

## 2019-12-27 NOTE — LETTER
December 27, 2019      To Whom It May Concern:      Gutierrez Mar was seen in our Emergency Department today, 12/27/19. Please excuse her from work today(12/27). I expect her condition to improve over the next 2-3 days.  She may return to work/school when improved.    Sincerely,        Deborah Mitchell PA-C

## 2019-12-27 NOTE — ED AVS SNAPSHOT
Emergency Department  [address] [place] [address]  Phone:  [phone]  Fax:  [phone]                                    Gutierrez Mar   MRN: [id number]    Department:   Emergency Department   Date of Visit:  12/27/2019           After Visit Summary Signature Page    I have received my discharge instructions, and my questions have been answered. I have discussed any challenges I see with this plan with the nurse or doctor.    ..........................................................................................................................................  Patient/Patient Representative Signature      ..........................................................................................................................................  Patient Representative Print Name and Relationship to Patient    ..................................................               ................................................  Date                                   Time    ..........................................................................................................................................  Reviewed by Signature/Title    ...................................................              ..............................................  Date                                               Time          22EPIC Rev 08/18

## 2019-12-27 NOTE — ED PROVIDER NOTES
History     Chief Complaint:  Back Pain    HPI   Gutierrez Mar is a 29 year old female who presents to the Emergency Department for evaluation of back pain. The patient reports complaint of back pain with inhalation for the past 1 week. She has never had any type of pain like this before. She denies any recent trauma or falls, but notes a work injury back in 2018 in which she fell.  She also endorses feeling fatigued and chilled. She also complains of chest congestion as well as having a headache. Furthermore, she also reports she felt lightheaded today. She notes that she called her PCP earlier today who was concerned for possible pneumonia or PE and referred the patient to the ED for evaluation. She denies having any leg swelling or overt chest pain. She denies having any recent fevers, cough, or runny nose. She also endorses some urinary incontinence which has been ongoing for the past 1.5 years. She denies having any dysuria. She denies having any abdominal pain, nausea, vomiting, or diarrhea.    Patient also reports that she recently discovered her boyfriend cheated on her 3 weeks ago, and she is now concerned for possible STD's. She denies having any pelvic pain or discharge.    PE/DVT RISK FACTORS:  The patient reports no history of tobacco use. She reports no personal, but a possible family history of blood clots. She has no history of cancer and is not on chemotherapy. She is not on birth control or other hormone therapy. She denies any recent travel, surgery, or other immobilization.    Allergies:  Keflet [Cephalexin]  Oxycodone    Medications:    The patient is currently on no regular medications.     Past Medical History:    Anemia  Metabolic syndrome X  PT prolonged  Sick cell trait  Vitamin D deficiency    Past Surgical History:    Breast augmentation  Breast implant removal  Liposuction abdomen, back and arms  Buttock implants  Buttock implants removal    Family History:    Blood clots  "(father)    Social History:  The patient presents to the ED alone.  Marital status: Single  Smoking status: Never Smoker  Alcohol use: No  Drug use: No  PCP: Clinic, [facility]    Review of Systems   Constitutional: Positive for chills and fatigue. Negative for fever.   HENT: Negative for rhinorrhea.    Respiratory: Negative for cough.    Cardiovascular: Negative for chest pain and leg swelling.   Gastrointestinal: Negative for abdominal pain, diarrhea, nausea and vomiting.   Genitourinary: Negative for dysuria, vaginal discharge and vaginal pain.   Musculoskeletal: Positive for back pain.   Neurological: Positive for light-headedness.   All other systems reviewed and are negative.    Physical Exam     Patient Vitals for the past 24 hrs:   BP Temp Temp src Pulse Heart Rate Resp SpO2 Height Weight   12/27/19 1400 (!) 130/94 -- -- 57 -- -- -- -- --   12/27/19 1227 118/50 97.6  F (36.4  C) Oral -- 67 16 100 % 1.575 m (5' 2\") 83 kg (183 lb)     Physical Exam  General: Resting comfortably.  Alert and oriented.   Head:  The scalp, face, and head appear normal   Eyes:  Conjunctivae and sclerae are normal    ENT:    The oropharynx is normal    Uvula is in the midline     Moist mucous membranes   Neck:  No lymphadenopathy  CV:  Regular rate and rhythm     Normal S1/S2    Peripheral pulses intact in all 4 extremities.     No peripheral edema.   Resp:  Lungs are clear to auscultation    Non-labored    No rales or wheezing     Equal air entry throughout.   GI:  Abdomen is soft, non-distended    No abdominal tenderness   :  Normal external female genitalia. White, thin discharge noted in the vaginal vault. Cervix is closed without significant drainage.   MS:  Moving all 4 extremities with good strength.   Skin:  No rash or acute skin lesions noted   Neuro: Speech is normal and fluent.     Emergency Department Course   ECG (13:21:37):  Rate 55 bpm. SC interval 150. QRS duration 80. QT/QTc 418/399. P-R-T axes 63 " 55 36. Sinus bradycardia. Otherwise normal ECG. Interpreted by Alma Huynh MD.    Imaging:  Radiographic findings were communicated with the patient who voiced understanding of the findings.    CHEST XR, PA & LAT:  IMPRESSION: No active cardiopulmonary disease or change since the  prior exam.  As read by Radiology.    Laboratory:  CBC: WNL (WBC 4.6,HGB 12.4, )  BMP: WNL (Creatinine 0.84)  Troponin: <0.015    UA: Mucous Present, o/w Negative  HCG Qualitative Urine (UPT): Negative    Wet Prep: Clue Cells Seen, o/w Negative    Chlamydia trachomatis PCR: in process  Neisseria gonorrhea PCR: in process    Interventions:  1407: Toradol 15 mg IM    Emergency Department Course:  Past medical records, nursing notes, and vitals reviewed.  1306: I performed an exam of the patient and obtained history, as documented above.   IV inserted and blood samples were collected and sent for laboratory testing, findings above.  A urine sample was collected and sent for laboratory testing, findings above.  EKG was obtained, results above.  The patient was sent for a chest x-ray while in the emergency department, findings above.    1409: The patient told nursing staff that she recently discovered her boyfriend has been cheating on her and she is now concerned for possible STD's.    1503: I rechecked the patient and explained the findings.    Findings and plan explained to the patient. Patient discharged home with instructions regarding supportive care and reasons to return. The importance of close follow-up was reviewed.    Impression & Plan      Medical Decision Making:  Gutierrez Mar is a 29 year old female who presents to the Emergency Department for evaluation of back pain.  Patient states that this is been ongoing over the last 7 days and is mainly present upon deep inspiration, causing her to call the nurse line.  They suggested she be seen here in the emergency department given concern for possible blood clot/PE.   Patient has no history of this in the past and has no risk factors for this.  She is PERC negative, therefore I do not believe d-dimer testing needs to be obtained at this time.  She has minimal tenderness over the left posterior rib cage.  No falls or direct injury to this area.  This is also worsening upon movement.  She also states that she has associated dizziness intermittently, but denies any overt chest pain, or any other symptoms.  She is also concerned and wants an STI check given her boyfriend reportedly cheated on her 3 weeks ago.  She denies any associated pelvic discomfort or increased vaginal discharge or any other concerns in regards to this.  Labs were reassuring including negative Troponin. Chest x-ray is negative for acute findings.  UA is normal without evidence for infection. UPT negative. Wet prep was resulted and demonstrated Clue cells concerning for BV. Patient has refills for metronidazole at home and will take metronidazole twice daily for 7 days. I did discuss with the patient that we will not receive the results of gonorrhea chlamydia testing until 2 to 3 days.  She will contacted only if the results are positive, and start on therapy at that time.  Did discuss empiric treatment, but patient is overall not overly concerned for STI, and would rather wait for treatment for the results to initiate treatment.  I feel this is reasonable as she is asymptomatic.  Overall, I believe the patient is safe to discharge home.  She has no other findings on physical exam, or complaints to suggest need for further work-up at this time.  I think this is likely consistent with muscle strain, or costochondritis/chest wall source.  No need for further work-up at this time.  To follow-up with primary physician in 2 to 3 days for recheck.  Did discuss importance of condom use, and comprehensive testing in the next 1 to 2 weeks.  She will return immediately for shortness of breath, worsening symptoms,  uncontrolled pain, fevers, chest pain, or any other concerns.  All questions were answered prior to discharge.  The patient understands and agrees to this plan.    Diagnosis:    ICD-10-CM   1. Back pain, unspecified back location, unspecified back pain laterality, unspecified chronicity M54.9   2. Bacterial vaginosis N76.0    B96.89       Disposition:  discharged to home      Evelyn Rand  12/27/2019    EMERGENCY DEPARTMENT    Scribe Disclosure:  I, Evelyn Rand, am serving as a scribe at 1:06 PM on 12/27/2019 to document services personally performed by Deborah Mitchell PA based on my observations and the provider's statements to me.        Deborah Mitchell PA-C  12/27/19 5730

## 2019-12-28 LAB — INTERPRETATION ECG - MUSE: NORMAL

## 2019-12-29 LAB
C TRACH DNA SPEC QL NAA+PROBE: NEGATIVE
N GONORRHOEA DNA SPEC QL NAA+PROBE: NEGATIVE
SPECIMEN SOURCE: NORMAL
SPECIMEN SOURCE: NORMAL

## 2020-02-23 ENCOUNTER — HEALTH MAINTENANCE LETTER (OUTPATIENT)
Age: 30
End: 2020-02-23

## 2020-07-07 ENCOUNTER — OFFICE VISIT (OUTPATIENT)
Dept: URGENT CARE | Facility: URGENT CARE | Age: 30
End: 2020-07-07
Payer: COMMERCIAL

## 2020-07-07 VITALS — DIASTOLIC BLOOD PRESSURE: 77 MMHG | TEMPERATURE: 99.2 F | SYSTOLIC BLOOD PRESSURE: 115 MMHG | HEART RATE: 74 BPM

## 2020-07-07 DIAGNOSIS — H57.12 LEFT EYE PAIN: Primary | ICD-10-CM

## 2020-07-07 DIAGNOSIS — H10.32 ACUTE CONJUNCTIVITIS OF LEFT EYE, UNSPECIFIED ACUTE CONJUNCTIVITIS TYPE: ICD-10-CM

## 2020-07-07 PROCEDURE — 99214 OFFICE O/P EST MOD 30 MIN: CPT | Performed by: FAMILY MEDICINE

## 2020-07-07 RX ORDER — AZELAIC ACID 0.15 G/G
AEROSOL, FOAM TOPICAL 2 TIMES DAILY
Status: ON HOLD | COMMUNITY
Start: 2020-05-17 | End: 2021-06-01

## 2020-07-07 RX ORDER — BUPROPION HYDROCHLORIDE 150 MG/1
150 TABLET ORAL EVERY MORNING
Status: ON HOLD | COMMUNITY
Start: 2020-06-16 | End: 2021-06-01

## 2020-07-07 RX ORDER — MINOCYCLINE HYDROCHLORIDE 50 MG/1
50 CAPSULE ORAL DAILY
Status: ON HOLD | COMMUNITY
Start: 2020-05-17 | End: 2021-06-01

## 2020-07-07 RX ORDER — ACETAMINOPHEN 325 MG/1
325-650 TABLET ORAL PRN
Status: ON HOLD | COMMUNITY
End: 2021-07-17

## 2020-07-07 RX ORDER — OXYBUTYNIN CHLORIDE 5 MG/1
5 TABLET, EXTENDED RELEASE ORAL DAILY
Status: ON HOLD | COMMUNITY
Start: 2020-01-14 | End: 2021-06-01

## 2020-07-07 RX ORDER — TRAZODONE HYDROCHLORIDE 100 MG/1
100 TABLET ORAL AT BEDTIME
Status: ON HOLD | COMMUNITY
Start: 2020-04-17 | End: 2021-06-01

## 2020-07-07 RX ORDER — TOBRAMYCIN 3 MG/ML
1 SOLUTION/ DROPS OPHTHALMIC EVERY 4 HOURS
Qty: 5 ML | Refills: 0 | Status: SHIPPED | OUTPATIENT
Start: 2020-07-07 | End: 2020-07-14

## 2020-07-07 NOTE — PATIENT INSTRUCTIONS
Okay to take ibuprofen 200 mg - 4 tablets (800 mg) every 8 hours as needed.  Okay to take tylenol 500 mg - 2 tablets (1000 mg) every 6-8 hours as needed, do not exceed 3000 mg in 24 hours.  Use antibiotic eye drop in left eye for 1 week  No contact lens use until symptoms resolves  Obtain full eye exam
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
## 2020-10-12 ENCOUNTER — HOSPITAL ENCOUNTER (EMERGENCY)
Facility: CLINIC | Age: 30
Discharge: HOME | End: 2020-10-12
Attending: NURSE PRACTITIONER | Admitting: NURSE PRACTITIONER
Payer: COMMERCIAL

## 2020-10-12 ENCOUNTER — APPOINTMENT (OUTPATIENT)
Dept: ULTRASOUND IMAGING | Facility: CLINIC | Age: 30
End: 2020-10-12
Attending: NURSE PRACTITIONER
Payer: COMMERCIAL

## 2020-10-12 VITALS
HEART RATE: 73 BPM | SYSTOLIC BLOOD PRESSURE: 127 MMHG | TEMPERATURE: 97.9 F | RESPIRATION RATE: 18 BRPM | DIASTOLIC BLOOD PRESSURE: 67 MMHG | OXYGEN SATURATION: 100 %

## 2020-10-12 DIAGNOSIS — D64.9 ANEMIA: ICD-10-CM

## 2020-10-12 DIAGNOSIS — B96.89 BACTERIAL VAGINOSIS: ICD-10-CM

## 2020-10-12 DIAGNOSIS — N76.0 BACTERIAL VAGINOSIS: ICD-10-CM

## 2020-10-12 DIAGNOSIS — O20.0 THREATENED MISCARRIAGE: Primary | ICD-10-CM

## 2020-10-12 LAB
ABO + RH BLD: NORMAL
ABO + RH BLD: NORMAL
ALBUMIN UR-MCNC: 10 MG/DL
APPEARANCE UR: CLEAR
B-HCG SERPL-ACNC: 208 IU/L (ref 0–5)
BASOPHILS # BLD AUTO: 0 10E9/L (ref 0–0.2)
BASOPHILS NFR BLD AUTO: 0.4 %
BILIRUB UR QL STRIP: NEGATIVE
BLD GP AB SCN SERPL QL: NORMAL
BLOOD BANK CMNT PATIENT-IMP: NORMAL
COLOR UR AUTO: YELLOW
DIFFERENTIAL METHOD BLD: ABNORMAL
EOSINOPHIL # BLD AUTO: 0.1 10E9/L (ref 0–0.7)
EOSINOPHIL NFR BLD AUTO: 1.1 %
ERYTHROCYTE [DISTWIDTH] IN BLOOD BY AUTOMATED COUNT: 14.8 % (ref 10–15)
GLUCOSE UR STRIP-MCNC: NEGATIVE MG/DL
HCT VFR BLD AUTO: 35.2 % (ref 35–47)
HGB BLD-MCNC: 11.1 G/DL (ref 11.7–15.7)
HGB UR QL STRIP: ABNORMAL
IMM GRANULOCYTES # BLD: 0 10E9/L (ref 0–0.4)
IMM GRANULOCYTES NFR BLD: 0.2 %
KETONES UR STRIP-MCNC: NEGATIVE MG/DL
LEUKOCYTE ESTERASE UR QL STRIP: NEGATIVE
LYMPHOCYTES # BLD AUTO: 2.2 10E9/L (ref 0.8–5.3)
LYMPHOCYTES NFR BLD AUTO: 39.1 %
MCH RBC QN AUTO: 21.5 PG (ref 26.5–33)
MCHC RBC AUTO-ENTMCNC: 31.5 G/DL (ref 31.5–36.5)
MCV RBC AUTO: 68 FL (ref 78–100)
MONOCYTES # BLD AUTO: 0.4 10E9/L (ref 0–1.3)
MONOCYTES NFR BLD AUTO: 7 %
MUCOUS THREADS #/AREA URNS LPF: PRESENT /LPF
NEUTROPHILS # BLD AUTO: 3 10E9/L (ref 1.6–8.3)
NEUTROPHILS NFR BLD AUTO: 52.2 %
NITRATE UR QL: NEGATIVE
NRBC # BLD AUTO: 0 10*3/UL
NRBC BLD AUTO-RTO: 0 /100
PH UR STRIP: 7.5 PH (ref 5–7)
PLATELET # BLD AUTO: 262 10E9/L (ref 150–450)
RBC # BLD AUTO: 5.16 10E12/L (ref 3.8–5.2)
RBC #/AREA URNS AUTO: 2 /HPF (ref 0–2)
SOURCE: ABNORMAL
SP GR UR STRIP: 1.02 (ref 1–1.03)
SPECIMEN EXP DATE BLD: NORMAL
SPECIMEN SOURCE: ABNORMAL
SQUAMOUS #/AREA URNS AUTO: 3 /HPF (ref 0–1)
UROBILINOGEN UR STRIP-MCNC: 2 MG/DL (ref 0–2)
WBC # BLD AUTO: 5.7 10E9/L (ref 4–11)
WBC #/AREA URNS AUTO: 1 /HPF (ref 0–5)
WET PREP SPEC: ABNORMAL

## 2020-10-12 PROCEDURE — 86900 BLOOD TYPING SEROLOGIC ABO: CPT | Performed by: NURSE PRACTITIONER

## 2020-10-12 PROCEDURE — 87210 SMEAR WET MOUNT SALINE/INK: CPT | Performed by: NURSE PRACTITIONER

## 2020-10-12 PROCEDURE — 87491 CHLMYD TRACH DNA AMP PROBE: CPT | Performed by: NURSE PRACTITIONER

## 2020-10-12 PROCEDURE — 86901 BLOOD TYPING SEROLOGIC RH(D): CPT | Performed by: NURSE PRACTITIONER

## 2020-10-12 PROCEDURE — 76801 OB US < 14 WKS SINGLE FETUS: CPT

## 2020-10-12 PROCEDURE — 86850 RBC ANTIBODY SCREEN: CPT | Performed by: NURSE PRACTITIONER

## 2020-10-12 PROCEDURE — 84702 CHORIONIC GONADOTROPIN TEST: CPT | Performed by: NURSE PRACTITIONER

## 2020-10-12 PROCEDURE — 81001 URINALYSIS AUTO W/SCOPE: CPT | Performed by: NURSE PRACTITIONER

## 2020-10-12 PROCEDURE — 87591 N.GONORRHOEAE DNA AMP PROB: CPT | Performed by: NURSE PRACTITIONER

## 2020-10-12 PROCEDURE — 99284 EMERGENCY DEPT VISIT MOD MDM: CPT | Mod: 25

## 2020-10-12 PROCEDURE — 85025 COMPLETE CBC W/AUTO DIFF WBC: CPT | Performed by: NURSE PRACTITIONER

## 2020-10-12 RX ORDER — METRONIDAZOLE 500 MG/1
500 TABLET ORAL 2 TIMES DAILY
Qty: 14 TABLET | Refills: 0 | Status: SHIPPED | OUTPATIENT
Start: 2020-10-12 | End: 2020-10-19

## 2020-10-12 NOTE — ED AVS SNAPSHOT
[facility] Emergency Dept  [address] [address]  Phone: [phone]  Fax: [phone]                                    Gutierrez Mar   MRN: [id number]    Department: [facility] Emergency Dept   Date of Visit: 10/12/2020           After Visit Summary Signature Page    I have received my discharge instructions, and my questions have been answered. I have discussed any challenges I see with this plan with the nurse or doctor.    ..........................................................................................................................................  Patient/Patient Representative Signature      ..........................................................................................................................................  Patient Representative Print Name and Relationship to Patient    ..................................................               ................................................  Date                                   Time    ..........................................................................................................................................  Reviewed by Signature/Title    ...................................................              ..............................................  Date                                               Time          22EPIC Rev 08/18

## 2020-10-12 NOTE — ED PROVIDER NOTES
History   Chief Complaint:  Vaginal Bleeding     The history is provided by the patient.      Gutierrez Mar is a 30 year old female with history of G5A2P2 who presents for evaluation of vaginal bleeding. The patient notes that she may be around 8 weeks pregnant. She states that her last menstrual cycle was .  She had a positive home pregnancy test 1-1/2 weeks ago.  She notes that she has had scant vaginal bleeding yesterday and bleeding increased today. She notes that she has had some dysuria. She reports that she has had to change her pad one time today. She does report that she had a sister that passed away from a ruptured ectopic pregnancy.  Her last pregnancy was .  She notes her blood type is A positive.    Allergies:  Clindamycin  Keflet [Cephalexin]  Oxycodone    Medications:   Wellbutrin   Prozac   Minocin   Oxybutynin   Trazodone     Past Medical History:    C. Difficile   Sickle cell trait  Anemia  Chest wall pain  Metabolic syndrome x  Mycobacterium fortuitum infection  Beta thalassemia    Past Surgical History:    Cosmetic surgery  Mammoplasty augmentation     Family History:    Ectopic pregnancy, sister ()  Hepatitis B, father  Bipolar disorder, brother  Kidney disease, brother   Asthma, mother    Social History:  Smoking status: never smoker  Alcohol use: no  Drug use: no  PCP: [facility]  Presents to the ED with   Up to date on immunization     Review of Systems   Genitourinary: Positive for pelvic pain, vaginal bleeding and vaginal pain.   All other systems reviewed and are negative.    Physical Exam     Patient Vitals for the past 24 hrs:   BP Temp Temp src Pulse Resp SpO2   10/12/20 1718 127/67 97.9  F (36.6  C) Temporal 73 18 100 %       Physical Exam  Nursing notes reviewed. Vitals reviewed.  General: Alert. Well kept.  Eyes:  Conjunctiva non-injected, non-icteric.  Neck/Throat: Moist mucous membranes. Normal voice.  Cardiac: Regular rhythm.  Normal heart sounds.  Pulmonary: Clear and equal breath sounds bilaterally.   Abdomen: soft and non-tender. No CVA tenderness.  : Pelvic exam performed in presence of chaperone.  External genitalia without rash or sore.  Normal vaginal mucosa with scant vaginal blood discharge.  No CMT or adnexal tenderness.  Musculoskeletal: Normal gross range of motion of all 4 extremities.   Neurological: Alert and oriented x4.   Skin: Warm and dry. Normal appearance of visualized exposed skin without rashes or petechiae.  Psych: Affect normal. Good eye contact.    Emergency Department Course     Imaging:  Radiology findings were communicated with the patient who voiced understanding of the findings.    US OB <14 weeks w transvag:   No definite intrauterine gestation demonstrated although there is a 3   mm saclike structure which would correspond to 4 weeks 5 days. There   is abnormal thickening of the endometrium that measures up to 1.6 cm.   Findings could be related to early gestation or failed pregnancy.   Short interval follow-up and serial beta hCG recommended.    Reading per radiology    Laboratory:  Laboratory findings were communicated with the patient who voiced understanding of the findings.    CBC: WBC 5.7, HGB 11.1(H),   UA with micro: moderate (A), pH 7.5 (H), protein albumin 10 (A), squamous epithelial/HPF 3 (H), mucous present (A) o/w negative   HCG Quantitative: 208(H)     Emergency Department Course:   Nursing notes and vitals reviewed.    1730 I performed an exam of the patient as documented above. The patient provided a urine sample here in the emergency department. This was sent for laboratory testing, findings above.     1740 IV was inserted and blood was drawn for laboratory testing, results above.     1755 The patient was sent for a US OB while in the emergency department, results above.      1856 I personally reviewed the results with the patient and answered all related questions prior to  discharge.    Findings and plan explained to the Patient and Spouse. Patient discharged home with instructions regarding supportive care, medications, and reasons to return. The importance of close follow-up was reviewed.       Impression & Plan      Medical Decision Making:  Gutierrez Mar is a 30 year old female who presents for evaluation of vaginal bleeding.  The workup here shows threatened miscarriage.  There is not a subchorionic hemorrhage.  The differential diagnosis of vaginal bleeding in a pregnant female is broad and includes common etiologies such ovarian cyst, UTI, pyelonephritis, subchorionic hemorrhage, uterine bleeding, active miscarriage, etc.  More rare but serious etiologies considered included ectopic pregnancy, appendicitis, heterotopic pregnancy, etc.  In this patient, there are no signs of serious etiologies of abdominal pain.  We did discuss that her hCG was quite low today and with no fetal cardiac activity this is likely fetal demise, but patient is unsure of date of pregnancy and has not had any prior screening.  No sign of ectopic pregnancy on ultrasound although this is not excluded at this time and was discussed with the patient.  Supportive outpatient management is therefore indicated and she understands the importance of close follow-up within the next 48 hours for repeat hCG.  Plan is home, close follow-up with OB, threatened miscarriage precautions, and return to ED for worsening pain, heavy vaginal bleeding (more than 1 pad soaked every hour).  Questions were answered.      Diagnosis:    ICD-10-CM    1. Threatened miscarriage  O20.0        Disposition:   The patient is discharged to home.     Scribe Disclosure:  Melissa BUI, am serving as a scribe at 5:27 PM on 10/12/2020 to document services personally performed by Stephanie Reyes DNP based on my observations and the provider's statements to me.  [facility] EMERGENCY DEPARTMENT         Stephanie Reyes, VICKY  10/13/20  0011

## 2020-10-12 NOTE — ED TRIAGE NOTES
Pt found out she was pregnant about a week and half ago. Pt started spotting yesterday and today vaginal bleeding has increased.

## 2020-10-12 NOTE — DISCHARGE INSTRUCTIONS
Discharge Instructions  Vaginal Bleeding in Pregnancy    Bleeding in early pregnancy can be a sign of a miscarriage or an abnormal pregnancy, but often is innocent and the pregnancy will continue normally. We may do blood pregnancy tests and ultrasound to try to determine what is causing the bleeding, but sometimes we are unable to tell. If this is the case, it will often require more time, more blood tests, and another ultrasound.     Generally, every Emergency Department visit should have a follow-up clinic visit with either a primary or a specialty clinic/provider. Please follow-up as instructed by your emergency provider today.    Return to the Emergency Department if:  You have severe abdominal (belly) or pelvic pain.  You faint, or feel lightheaded or dizzy.   Your bleeding gets much worse.  You pass any tissue--solid material that does not look like a blood clot. If you pass tissue, save it (even if you have to pull it out of the toilet) and put it in a plastic bag or jar and bring it in.    You have a fever of 100.5 F or higher.  If no pregnancy could be seen:  It may be that everything is normal and it is just too early to see the pregnancy. It is also possible that you could have an ectopic pregnancy, which is a pregnancy in an abnormal location, such as in the tube ( tubal pregnancy ). We don t see evidence that this is likely today but we cannot exclude it with certainty until a pregnancy can be seen in the uterus (womb) and an ectopic pregnancy can cause severe internal bleeding or death so follow-up is very important.  You should not be alone, in case you suddenly become very sick.   You should not have sex or put anything in your vagina.     If a pregnancy was seen in your uterus:  If a heartbeat could be seen, the chance of miscarriage is lower but because you had bleeding the chance of a miscarriage still exists.  You should not have sex or put anything in your vagina.  Follow-up as directed with  your OB/GYN provider.     Facts about miscarriage: We hope you do not have a miscarriage, but if you do, here are important things to know:  Early miscarriage is very common, and having one miscarriage does not mean you will have problems with another pregnancy.  Nothing you did caused it. Taking medicine, drinking alcohol, having sex, exercising, or falling down will not cause a miscarriage.     If you were given a prescription for medicine here today, be sure to read all of the information (including the package insert) that comes with your prescription.  This will include important information about the medicine, its side effects, and any warnings that you need to know about.  The pharmacist who fills the prescription can provide more information and answer questions you may have about the medicine.  If you have questions or concerns that the pharmacist cannot address, please call or return to the Emergency Department.   Remember that you can always come back to the Emergency Department if you are not able to see your regular provider in the amount of time listed above, if you get any new symptoms, or if there is anything that worries you.

## 2020-10-12 NOTE — LETTER
October 12, 2020      To Whom It May Concern:      Gutiererz Mar was seen in our Emergency Department today, 10/12/20. Please excuse Gutierrez from work on 10/12 and 10/13 due to illness.  She may return to work when improved.    Sincerely,        Stephanie Reyes, CNP

## 2020-10-13 NOTE — RESULT ENCOUNTER NOTE
Final result for both N. Gonorrhoeae PCR and Chlamydia Trachomatis PCR are NEGATIVE.  No treatment or change in treatment per [place] ED Lab Result protocol.

## 2020-12-06 ENCOUNTER — HEALTH MAINTENANCE LETTER (OUTPATIENT)
Age: 30
End: 2020-12-06

## 2020-12-17 LAB
HEPATITIS B SURFACE ANTIGEN (EXTERNAL): NEGATIVE
HIV1+2 AB SERPL QL IA: NEGATIVE
RUBELLA ANTIBODY IGG (EXTERNAL): POSITIVE

## 2021-01-10 ENCOUNTER — APPOINTMENT (OUTPATIENT)
Dept: ULTRASOUND IMAGING | Facility: CLINIC | Age: 31
End: 2021-01-10
Attending: EMERGENCY MEDICINE
Payer: COMMERCIAL

## 2021-01-10 ENCOUNTER — HOSPITAL ENCOUNTER (EMERGENCY)
Facility: CLINIC | Age: 31
Discharge: HOME | End: 2021-01-10
Attending: EMERGENCY MEDICINE | Admitting: EMERGENCY MEDICINE
Payer: COMMERCIAL

## 2021-01-10 VITALS
BODY MASS INDEX: 32.76 KG/M2 | DIASTOLIC BLOOD PRESSURE: 59 MMHG | SYSTOLIC BLOOD PRESSURE: 117 MMHG | TEMPERATURE: 97.9 F | HEART RATE: 65 BPM | OXYGEN SATURATION: 98 % | WEIGHT: 178 LBS | RESPIRATION RATE: 16 BRPM | HEIGHT: 62 IN

## 2021-01-10 DIAGNOSIS — O20.0 THREATENED ABORTION: ICD-10-CM

## 2021-01-10 LAB
B-HCG SERPL-ACNC: ABNORMAL IU/L (ref 0–5)
BASOPHILS # BLD AUTO: 0 10E9/L (ref 0–0.2)
BASOPHILS NFR BLD AUTO: 0.6 %
DIFFERENTIAL METHOD BLD: ABNORMAL
EOSINOPHIL # BLD AUTO: 0 10E9/L (ref 0–0.7)
EOSINOPHIL NFR BLD AUTO: 0.6 %
ERYTHROCYTE [DISTWIDTH] IN BLOOD BY AUTOMATED COUNT: 14.6 % (ref 10–15)
HCT VFR BLD AUTO: 35.9 % (ref 35–47)
HGB BLD-MCNC: 11 G/DL (ref 11.7–15.7)
IMM GRANULOCYTES # BLD: 0 10E9/L (ref 0–0.4)
IMM GRANULOCYTES NFR BLD: 0.2 %
LYMPHOCYTES # BLD AUTO: 1.7 10E9/L (ref 0.8–5.3)
LYMPHOCYTES NFR BLD AUTO: 31.9 %
MCH RBC QN AUTO: 21.3 PG (ref 26.5–33)
MCHC RBC AUTO-ENTMCNC: 30.6 G/DL (ref 31.5–36.5)
MCV RBC AUTO: 69 FL (ref 78–100)
MONOCYTES # BLD AUTO: 0.5 10E9/L (ref 0–1.3)
MONOCYTES NFR BLD AUTO: 8.5 %
NEUTROPHILS # BLD AUTO: 3.2 10E9/L (ref 1.6–8.3)
NEUTROPHILS NFR BLD AUTO: 58.2 %
NRBC # BLD AUTO: 0 10*3/UL
NRBC BLD AUTO-RTO: 0 /100
PLATELET # BLD AUTO: 260 10E9/L (ref 150–450)
RBC # BLD AUTO: 5.17 10E12/L (ref 3.8–5.2)
WBC # BLD AUTO: 5.4 10E9/L (ref 4–11)

## 2021-01-10 PROCEDURE — 76801 OB US < 14 WKS SINGLE FETUS: CPT

## 2021-01-10 PROCEDURE — 84702 CHORIONIC GONADOTROPIN TEST: CPT | Performed by: EMERGENCY MEDICINE

## 2021-01-10 PROCEDURE — 99284 EMERGENCY DEPT VISIT MOD MDM: CPT | Mod: 25

## 2021-01-10 PROCEDURE — 85025 COMPLETE CBC W/AUTO DIFF WBC: CPT | Performed by: EMERGENCY MEDICINE

## 2021-01-10 ASSESSMENT — MIFFLIN-ST. JEOR: SCORE: 1490.65

## 2021-01-10 NOTE — ED PROVIDER NOTES
"  History   Chief Complaint:  Vaginal Bleeding     The history is provided by the patient.      Gutierrez Mar is a 30 year old female that is A3 that is 3 months pregnant with history of 2020 and October with 2 threatened abortions who presents with vaginal bleeding. The patient notes that she has had an ultrasound since her last miscarriage. She states that she has had some slight vaginal bleeding. She states that she is O positive.       Review of Systems   Genitourinary: Positive for vaginal bleeding.   All other systems reviewed and are negative.      Allergies:  Clindamycin  Keflet [Cephalexin]  Oxycodone    Medications:  Wellbutrin  Prozac  Oxybutynin   Trazodone     Past Medical History:    C. difficile diarrhea  Sickle cell trait   Poor diet  Fatigue   Anemia  Metabolic syndrome x      Past Surgical History:    Cosmetic surgery   Mammoplasty augmentation   Mouth surgery     Family History:    Asthma, mother   Hypertension, mother   Bipolar disorder, brother   Sleep apnea, brother   Diabetes   Cancer     Social History:  Smoking status: no  Alcohol use: no  Drug use: no  PCP: [facility]  Presents to the ED alone        Physical Exam     Patient Vitals for the past 24 hrs:   BP Temp Temp src Pulse Resp SpO2 Height Weight   01/10/21 1420 117/59 -- -- -- -- -- -- --   01/10/21 1320 (!) 151/92 97.9  F (36.6  C) Oral 65 16 98 % 1.575 m (5' 2\") 80.7 kg (178 lb)       Physical Exam  Constitutional: Heavy set black female, supine.  HENT: No signs of trauma.   Eyes: EOM are normal. Pupils are equal, round, and reactive to light.   Neck: Normal range of motion. No JVD present. No cervical adenopathy.  Cardiovascular: Regular rhythm.  Exam reveals no gallop and no friction rub.    No murmur heard.  Pulmonary/Chest: Bilateral breath sounds normal. No wheezes, rhonchi or rales.  Abdominal: Soft. Mild suprapubic tenderness. No rebound or guarding.   Pelvic: Vulva negative. Vagina no " active bleeding. Cervix closed. Uterus slight enlarged. No tenderness or masses.  Musculoskeletal: No edema. No tenderness.   Lymphadenopathy: No lymphadenopathy.   Neurological: Alert and oriented to person, place, and time. Normal strength. Coordination normal.   Skin: Skin is warm and dry. No rash noted. No erythema.       Emergency Department Course     Imaging:  US OB <14 weeks w transvag   Single living intrauterine gestation.     Reading per radiology    Laboratory:  CBC: WBC 5.4, HGB 11.0,    HCG Quantitative: pending    Emergency Department Course:  Reviewed:  I reviewed the patient's nursing notes, vitals, past medical records, Care Everywhere.     Assessments:  1340 I performed an assessment and examination of the patient as noted above.     1350 I performed a vaginal exam, see above.     1448 Findings and plan explained to the Patient. Patient discharged home with instructions regarding supportive care, medications, and reasons to return. The importance of close follow-up was reviewed.       Disposition:  The patient was discharged to home.       Impression & Plan   Medical Decision Making:  This is a 30 year old  who presents with new onset vaginal bleeding. She has had an ultrasound done which showed an intrauterine pregnancy. She has developed some bleeding and spotting but no weakness, dizziness or other symptoms. On exam, her abdomen is soft. On pelvic, there is no obvious blood but there is a little dark discoloration in the cervical os. Ultrasound shows a 12 week and 2 day intrauterine pregnancy with good heartbeat. No other abnormalities are seen. CBC is good and she is O positive. Patient will be discharged home. She should not have intercourse or put anything into her vagina for the next 2-3 days and should follow up with her OB GYN. If symptoms worsen she should return to the ED.       Diagnosis:    ICD-10-CM    1. Threatened   O20.0        Scribe Disclosure:  Melissa BUI  Michelle, am serving as a scribe at 1:15 PM on 1/10/2021 to document services personally performed by Kulwinder Vick MD based on my observations and the provider's statements to me.              Kulwinder Vick MD  01/10/21 150

## 2021-06-01 ENCOUNTER — HOSPITAL ENCOUNTER (EMERGENCY)
Facility: CLINIC | Age: 31
End: 2021-06-01
Payer: COMMERCIAL

## 2021-06-01 ENCOUNTER — APPOINTMENT (OUTPATIENT)
Dept: ULTRASOUND IMAGING | Facility: CLINIC | Age: 31
End: 2021-06-01
Attending: OBSTETRICS & GYNECOLOGY
Payer: COMMERCIAL

## 2021-06-01 ENCOUNTER — HOSPITAL ENCOUNTER (OUTPATIENT)
Facility: CLINIC | Age: 31
End: 2021-06-01
Admitting: OBSTETRICS & GYNECOLOGY
Payer: COMMERCIAL

## 2021-06-01 ENCOUNTER — HOSPITAL ENCOUNTER (OUTPATIENT)
Facility: CLINIC | Age: 31
Discharge: HOME | End: 2021-06-01
Attending: OBSTETRICS & GYNECOLOGY | Admitting: OBSTETRICS & GYNECOLOGY
Payer: COMMERCIAL

## 2021-06-01 VITALS — RESPIRATION RATE: 18 BRPM | TEMPERATURE: 98.6 F | SYSTOLIC BLOOD PRESSURE: 124 MMHG | DIASTOLIC BLOOD PRESSURE: 74 MMHG

## 2021-06-01 DIAGNOSIS — K21.9 GASTROESOPHAGEAL REFLUX DISEASE WITHOUT ESOPHAGITIS: Primary | ICD-10-CM

## 2021-06-01 PROBLEM — Z36.89 ENCOUNTER FOR TRIAGE IN PREGNANT PATIENT: Status: ACTIVE | Noted: 2021-06-01

## 2021-06-01 LAB
ALBUMIN UR-MCNC: 30 MG/DL
APPEARANCE UR: CLEAR
BILIRUB UR QL STRIP: NEGATIVE
COLOR UR AUTO: ABNORMAL
GLUCOSE UR STRIP-MCNC: NEGATIVE MG/DL
HGB UR QL STRIP: NEGATIVE
HYALINE CASTS #/AREA URNS LPF: 1 /LPF (ref 0–2)
KETONES UR STRIP-MCNC: NEGATIVE MG/DL
LABORATORY COMMENT REPORT: NORMAL
LEUKOCYTE ESTERASE UR QL STRIP: NEGATIVE
MUCOUS THREADS #/AREA URNS LPF: PRESENT /LPF
NITRATE UR QL: NEGATIVE
PH UR STRIP: 6 PH (ref 5–7)
RBC #/AREA URNS AUTO: 1 /HPF (ref 0–2)
SARS-COV-2 RNA RESP QL NAA+PROBE: NEGATIVE
SOURCE: ABNORMAL
SP GR UR STRIP: 1.03 (ref 1–1.03)
SPECIMEN SOURCE: NORMAL
SQUAMOUS #/AREA URNS AUTO: 4 /HPF (ref 0–1)
UROBILINOGEN UR STRIP-MCNC: NORMAL MG/DL (ref 0–2)
WBC #/AREA URNS AUTO: 3 /HPF (ref 0–5)

## 2021-06-01 PROCEDURE — 87635 SARS-COV-2 COVID-19 AMP PRB: CPT | Performed by: OBSTETRICS & GYNECOLOGY

## 2021-06-01 PROCEDURE — 81001 URINALYSIS AUTO W/SCOPE: CPT | Performed by: OBSTETRICS & GYNECOLOGY

## 2021-06-01 PROCEDURE — 76816 OB US FOLLOW-UP PER FETUS: CPT

## 2021-06-01 PROCEDURE — G0463 HOSPITAL OUTPT CLINIC VISIT: HCPCS

## 2021-06-01 PROCEDURE — 96374 THER/PROPH/DIAG INJ IV PUSH: CPT

## 2021-06-01 PROCEDURE — 250N000013 HC RX MED GY IP 250 OP 250 PS 637: Performed by: OBSTETRICS & GYNECOLOGY

## 2021-06-01 PROCEDURE — 258N000003 HC RX IP 258 OP 636: Performed by: OBSTETRICS & GYNECOLOGY

## 2021-06-01 PROCEDURE — 96360 HYDRATION IV INFUSION INIT: CPT

## 2021-06-01 PROCEDURE — 76819 FETAL BIOPHYS PROFIL W/O NST: CPT

## 2021-06-01 RX ORDER — CITRIC ACID/SODIUM CITRATE 334-500MG
30 SOLUTION, ORAL ORAL ONCE
Status: COMPLETED | OUTPATIENT
Start: 2021-06-01 | End: 2021-06-01

## 2021-06-01 RX ORDER — POLYETHYLENE GLYCOL 3350 17 G/17G
1 POWDER, FOR SOLUTION ORAL DAILY
COMMUNITY

## 2021-06-01 RX ORDER — CITRIC ACID/SODIUM CITRATE 334-500MG
30 SOLUTION, ORAL ORAL
Qty: 300 ML | Refills: 3 | Status: ON HOLD | OUTPATIENT
Start: 2021-06-01 | End: 2021-07-17

## 2021-06-01 RX ADMIN — SODIUM CHLORIDE, POTASSIUM CHLORIDE, SODIUM LACTATE AND CALCIUM CHLORIDE 500 ML: 600; 310; 30; 20 INJECTION, SOLUTION INTRAVENOUS at 20:52

## 2021-06-01 RX ADMIN — SODIUM CITRATE AND CITRIC ACID MONOHYDRATE 30 ML: 500; 334 SOLUTION ORAL at 21:21

## 2021-06-01 NOTE — PLAN OF CARE
Dr Melina Bautista informed of patient arrival and assessment including the following:    Reason for maternal/fetal assessment decreased fetal movement and GI symptoms. Fetal status concerning for absence of accelerations.

## 2021-06-01 NOTE — PLAN OF CARE
"Data: Patient presented to [facility]: 2021  6:21 PM.  Reason for maternal/fetal assessment is decreased fetal movement, GI symptoms. Patient reports decreased fetal movement for 4 days. Pt also states \"I have had N/V/D for the last few days and have hardly kept anything down\".  Patient is a .  Prenatal record reviewed. Pregnancy  has been uncomplicated.  Gestational Age 32w4d. VSS. Fetal movement decreased since \"last four days\". Patient denies uterine contractions, vaginal bleeding, abdominal pain, pelvic pressure, headache, visual disturbances, epigastric or URQ pain, significant edema. Support person is not present.   Action: Verbal consent for EFM. Triage assessment completed. Bill of rights reviewed.  Response: Patient verbalized agreement with plan. Will contact Dr Melina Bautista with update and for further orders.     "

## 2021-06-02 NOTE — PROVIDER NOTIFICATION
06/01/21 2305   Provider Notification   Provider Name/Title Dr Bautista   Method of Notification Phone   Request Evaluate - Remote   Notification Reason Other (Comment)   Dr Bautista called in and Pt can go home.BPP is 8/8 and has normal growth US.  Data: Patient assessed in the [facility] for heart burn nausea and GERD symptoms .  Cervical exam not examined.  Membranes intact.  Contractions/uterine assessment none.  Action:  Presumed adequate fetal oxygenation documented (see flow record). Discharge instructions reviewed.  Patient instructed to report change in fetal movement, vaginal leaking of fluid or bleeding, abdominal pain, or any concerns related to the pregnancy to her nurse/physician.    Response: Orders to discharge home per Melina Bautista.  Patient verbalized understanding of education and verbalized agreement with plan. Discharged to home at 2330.

## 2021-06-02 NOTE — PROVIDER NOTIFICATION
06/01/21 1947   Provider Notification   Provider Name/Title Dr Bautista   Method of Notification At Bedside   Request Evaluate in Person   Notification Reason Other (Comment)   MD at  talked to pt and received orders(see orders)patient agreed with plans.

## 2021-06-02 NOTE — PROVIDER NOTIFICATION
06/01/21 2137   Comments   Comments to radiology for BPP.   IV fluid bolus done and feels little better after PO Bicitra.Saline lock done for now.

## 2021-06-02 NOTE — PROVIDER NOTIFICATION
06/01/21 9307   Provider Notification   Provider Name/Title Dr Bautista   Method of Notification Phone   Request Evaluate - Remote   Notification Reason Other (Comment);Lab/Diagnostic Study;Patient Request   Pt back from radiology after BPP and growth US.Preliminary report received and read to ,Pt is requesting Bicitra prescription as pt said it worked better then any other meds. Pt has not gave stool sample.MD is going to sent E-prescriptions for Bicitra to [facility] [place] [id number] as per pt's request and   would like to wait until radiologist  to read US report before Discharge her. POC d/w pt and she voiced understandings.

## 2021-06-02 NOTE — DISCHARGE INSTRUCTIONS
Discharge Instruction for Undelivered Patients      You were seen for: Decreased FM, nausea and GERD  We Consulted: Dr Bautista  You had (Test or Medicine):EFM,BPP with growth US, IV fluid and Bicitra.COVID test done.     Diet:   Drink 8 to 12 glasses of liquids (milk, juice, water) every day.  You may eat meals and snacks.     Activity:  Call your doctor or nurse midwife if your baby is moving less than usual.     Call your provider if you notice:  Swelling in your face or increased swelling in your hands or legs.  Headaches that are not relieved by Tylenol (acetaminophen).  Changes in your vision (blurring: seeing spots or stars.)  Nausea (sick to your stomach) and vomiting (throwing up).   Weight gain of 5 pounds or more per week.  Heartburn that doesn't go away.  Signs of bladder infection: pain when you urinate (use the toilet), need to go more often and more urgently.  The bag of machado (rupture of membranes) breaks, or you notice leaking in your underwear.  Bright red blood in your underwear.  Abdominal (lower belly) or stomach pain.  For first baby: Contractions (tightening) less than 5 minutes apart for one hour or more.  Second (plus) baby: Contractions (tightening) less than 10 minutes apart and getting stronger.  *If less than 34 weeks: Contractions (tightenings) more than 6 times in one hour.  Increase or change in vaginal discharge (note the color and amount)  Other: none    Follow-up:  As scheduled in the clinic

## 2021-06-02 NOTE — H&P
Labor & Delivery History & Physical  Patient Name:  Gutierrez Mott   MRN:  [id number]  Age:  30 year old    YOB: 1990      Subjective:    Gutierrez Mott is a 30 year old  female with JIAN: 2021, by Patient Reported,  Gestational Age: 32w4d who presents for 3-4 day h/o loose BM, urinary incontinence, fecal incontinence, decreased FM, and persistent GERD and N/V.  She denies eating undercooked or unwashed food; no sick contacts.  Denies respiratory symptoms, fever, n/v, chills.  She has struggled with GERD and N/V the entire pregnancy.  She is concerned about fetal growth given her decreased PO intake.   Denies contractions.   She had similar symptoms about 3 weeks ago; lasted 2 days and resolved spontaneously.  Had no h/o GI issues in previous pregnancies or outside of pregnancy.  She is a nurse (med/peds)    Patient denies leaking of fluid or vaginal bleeding.  Fetal Movement: present, but decreased.         Pregnancy Episode Problems (from 21 to present)     No problems associated with this episode.        OB History    Para Term  AB Living   3 2 2 0 0 1   SAB TAB Ectopic Multiple Live Births   0 0 0 0 2      # Outcome Date GA Lbr Saúl/2nd Weight Sex Delivery Anes PTL Lv   3 Current            2 Term 13 39w2d / 00:06 2.675 kg (5 lb 14.4 oz) M Vag-Spont EPI  ELMER      Name: SUJEY MOTT      Apgar1: 8  Apgar5: 9   1 Term 09    F Vag-Spont EPI N ELMER     Past Medical History:   Diagnosis Date     C. difficile diarrhea      Depressive disorder     Hx ppd     Sickle cell trait (H)      Past Surgical History:   Procedure Laterality Date     COSMETIC SURGERY       MAMMOPLASTY AUGMENTATION  2016     Social History     Tobacco Use     Smoking status: Never Smoker     Smokeless tobacco: Never Used   Substance Use Topics     Alcohol use: No     Alcohol/week: 0.0 standard drinks     Drug use: No      History   Drug Use No     Family History   Family  history unknown: Yes     [unfilled]   Medications Prior to Admission   Medication Sig Dispense Refill Last Dose     polyethylene glycol (MIRALAX) 17 g packet Take 1 packet by mouth daily        acetaminophen (TYLENOL) 325 MG tablet Take 325-650 mg by mouth as needed         Most Recent Immunizations   Administered Date(s) Administered     Influenza Vaccine, 6+MO IM (QUADRIVALENT W/PRESERVATIVES) 10/01/2016     TDAP Vaccine (Adacel) 2013       Review of Systems - NEGATIVE FOR  Fevers  Chills  Headache  Visual changes  Ear pain  Sore throat  Cough  Shortness of breath  Chest pain  Palpitations  Constipation  Diarrhea  Vomiting  Bloody stools  Hematuria  Dysuria  Muscle weakness  Gait disturbance    Objective:  Temp:  [98.6  F (37  C)] 98.6  F (37  C)  Resp:  [18] 18  BP: (124)/(74) 124/74  0 lbs 0 oz      General: General appearance: alert, well appearing, and in no distress.   Lungs:   unlabored   Heart:     Abdomen: Gravid, nontender   Weiner: Contraction Frequency (min):  none  Contraction Duration (sec):     FHT: Baseline 130 BPM   Fetal heart variability:moderate  Fetal heart rate accelerations:  Present 15 x 15  Fetal heart rate decelerations: none  Fetal heart rate interpretation:  Category I   Speculum:    Cervix:       Extremities: Trace to 1+ edema bilateral, symmetric edema; neg Linh's sign      Lab Review:    Hemoglobin   Date Value Ref Range Status   01/10/2021 11.0 (L) 11.7 - 15.7 g/dL Final   10/12/2020 11.1 (L) 11.7 - 15.7 g/dL Final   2019 12.4 11.7 - 15.7 g/dL Final     Hematocrit   Date Value Ref Range Status   01/10/2021 35.9 35.0 - 47.0 % Final   10/12/2020 35.2 35.0 - 47.0 % Final   2019 38.8 35.0 - 47.0 % Final     TSH   Date Value Ref Range Status   2017 0.63 0.40 - 4.00 mU/L Final         Assessment  Gutierrez Mar is a 30 year old  female with JIAN: 2021, by Patient Reported,  Gestational Age: 32w4d who presents with GI symptoms and decreased  FM    Plan  - Decreased FM: BPP and growth US ordered.  NST reactive with no decels, but less accels than expected.  - GI symptoms: UA and ova & parasite collected.  COVID test also ordered.   - GERD: will try bicitra now.  Will also give 500ml bolus  - If workup neg and BPP normal, can DC home with close f/u.

## 2021-06-15 LAB — TREPONEMA PALLIDUM ANTIBODY (EXTERNAL): NEGATIVE

## 2021-06-29 LAB — GROUP B STREPTOCOCCUS (EXTERNAL): NEGATIVE

## 2021-07-09 DIAGNOSIS — Z34.90 ENCOUNTER FOR PLANNED INDUCTION OF LABOR: Primary | ICD-10-CM

## 2021-07-12 DIAGNOSIS — Z34.90 ENCOUNTER FOR PLANNED INDUCTION OF LABOR: ICD-10-CM

## 2021-07-12 PROCEDURE — 99207 PR NO CHARGE LOS: CPT

## 2021-07-12 PROCEDURE — U0003 INFECTIOUS AGENT DETECTION BY NUCLEIC ACID (DNA OR RNA); SEVERE ACUTE RESPIRATORY SYNDROME CORONAVIRUS 2 (SARS-COV-2) (CORONAVIRUS DISEASE [COVID-19]), AMPLIFIED PROBE TECHNIQUE, MAKING USE OF HIGH THROUGHPUT TECHNOLOGIES AS DESCRIBED BY CMS-2020-01-R: HCPCS

## 2021-07-12 PROCEDURE — U0005 INFEC AGEN DETEC AMPLI PROBE: HCPCS

## 2021-07-13 LAB — SARS-COV-2 RNA RESP QL NAA+PROBE: NEGATIVE

## 2021-07-16 ENCOUNTER — ANESTHESIA EVENT (OUTPATIENT)
Dept: OBGYN | Facility: CLINIC | Age: 31
End: 2021-07-16
Payer: COMMERCIAL

## 2021-07-16 ENCOUNTER — HOSPITAL ENCOUNTER (INPATIENT)
Facility: CLINIC | Age: 31
LOS: 2 days | Discharge: HOME | End: 2021-07-18
Attending: OBSTETRICS & GYNECOLOGY | Admitting: OBSTETRICS & GYNECOLOGY
Payer: COMMERCIAL

## 2021-07-16 ENCOUNTER — ANESTHESIA (OUTPATIENT)
Dept: OBGYN | Facility: CLINIC | Age: 31
End: 2021-07-16
Payer: COMMERCIAL

## 2021-07-16 LAB
ABO/RH(D): NORMAL
ANTIBODY SCREEN: NEGATIVE
HGB BLD-MCNC: 11 G/DL (ref 11.7–15.7)
SPECIMEN EXPIRATION DATE: NORMAL
T PALLIDUM AB SER QL: NONREACTIVE

## 2021-07-16 PROCEDURE — 250N000011 HC RX IP 250 OP 636: Performed by: ANESTHESIOLOGY

## 2021-07-16 PROCEDURE — 86780 TREPONEMA PALLIDUM: CPT | Performed by: OBSTETRICS & GYNECOLOGY

## 2021-07-16 PROCEDURE — 85018 HEMOGLOBIN: CPT | Performed by: OBSTETRICS & GYNECOLOGY

## 2021-07-16 PROCEDURE — 3E0R3BZ INTRODUCTION OF ANESTHETIC AGENT INTO SPINAL CANAL, PERCUTANEOUS APPROACH: ICD-10-PCS | Performed by: ANESTHESIOLOGY

## 2021-07-16 PROCEDURE — 258N000003 HC RX IP 258 OP 636: Performed by: OBSTETRICS & GYNECOLOGY

## 2021-07-16 PROCEDURE — 88307 TISSUE EXAM BY PATHOLOGIST: CPT | Mod: TC | Performed by: OBSTETRICS & GYNECOLOGY

## 2021-07-16 PROCEDURE — 00HU33Z INSERTION OF INFUSION DEVICE INTO SPINAL CANAL, PERCUTANEOUS APPROACH: ICD-10-PCS | Performed by: ANESTHESIOLOGY

## 2021-07-16 PROCEDURE — 88307 TISSUE EXAM BY PATHOLOGIST: CPT | Mod: 26 | Performed by: PATHOLOGY

## 2021-07-16 PROCEDURE — 722N000001 HC LABOR CARE VAGINAL DELIVERY SINGLE

## 2021-07-16 PROCEDURE — 250N000013 HC RX MED GY IP 250 OP 250 PS 637: Performed by: OBSTETRICS & GYNECOLOGY

## 2021-07-16 PROCEDURE — 36415 COLL VENOUS BLD VENIPUNCTURE: CPT | Performed by: OBSTETRICS & GYNECOLOGY

## 2021-07-16 PROCEDURE — 120N000001 HC R&B MED SURG/OB

## 2021-07-16 PROCEDURE — 370N000003 HC ANESTHESIA WARD SERVICE

## 2021-07-16 PROCEDURE — 250N000009 HC RX 250: Performed by: OBSTETRICS & GYNECOLOGY

## 2021-07-16 PROCEDURE — 86900 BLOOD TYPING SEROLOGIC ABO: CPT | Performed by: OBSTETRICS & GYNECOLOGY

## 2021-07-16 RX ORDER — ONDANSETRON 2 MG/ML
4 INJECTION INTRAMUSCULAR; INTRAVENOUS EVERY 6 HOURS PRN
Status: DISCONTINUED | OUTPATIENT
Start: 2021-07-16 | End: 2021-07-16 | Stop reason: HOSPADM

## 2021-07-16 RX ORDER — HYDROCORTISONE 2.5 %
CREAM (GRAM) TOPICAL 3 TIMES DAILY PRN
Status: DISCONTINUED | OUTPATIENT
Start: 2021-07-16 | End: 2021-07-18 | Stop reason: HOSPADM

## 2021-07-16 RX ORDER — CARBOPROST TROMETHAMINE 250 UG/ML
250 INJECTION, SOLUTION INTRAMUSCULAR
Status: DISCONTINUED | OUTPATIENT
Start: 2021-07-16 | End: 2021-07-18 | Stop reason: HOSPADM

## 2021-07-16 RX ORDER — EPHEDRINE SULFATE 50 MG/ML
5 INJECTION, SOLUTION INTRAMUSCULAR; INTRAVENOUS; SUBCUTANEOUS
Status: DISCONTINUED | OUTPATIENT
Start: 2021-07-16 | End: 2021-07-16 | Stop reason: HOSPADM

## 2021-07-16 RX ORDER — DOCUSATE SODIUM 100 MG/1
100 CAPSULE, LIQUID FILLED ORAL DAILY
Status: DISCONTINUED | OUTPATIENT
Start: 2021-07-16 | End: 2021-07-18 | Stop reason: HOSPADM

## 2021-07-16 RX ORDER — OXYTOCIN/0.9 % SODIUM CHLORIDE 30/500 ML
100-340 PLASTIC BAG, INJECTION (ML) INTRAVENOUS ONCE
Status: DISCONTINUED | OUTPATIENT
Start: 2021-07-16 | End: 2021-07-16

## 2021-07-16 RX ORDER — LIDOCAINE 40 MG/G
CREAM TOPICAL
Status: DISCONTINUED | OUTPATIENT
Start: 2021-07-16 | End: 2021-07-16 | Stop reason: HOSPADM

## 2021-07-16 RX ORDER — MISOPROSTOL 200 UG/1
800 TABLET ORAL PRN
Status: DISCONTINUED | OUTPATIENT
Start: 2021-07-16 | End: 2021-07-18 | Stop reason: HOSPADM

## 2021-07-16 RX ORDER — NALOXONE HYDROCHLORIDE 0.4 MG/ML
0.4 INJECTION, SOLUTION INTRAMUSCULAR; INTRAVENOUS; SUBCUTANEOUS
Status: DISCONTINUED | OUTPATIENT
Start: 2021-07-16 | End: 2021-07-16 | Stop reason: HOSPADM

## 2021-07-16 RX ORDER — IBUPROFEN 800 MG/1
800 TABLET, FILM COATED ORAL EVERY 6 HOURS PRN
Status: DISCONTINUED | OUTPATIENT
Start: 2021-07-16 | End: 2021-07-16

## 2021-07-16 RX ORDER — OXYTOCIN 10 [USP'U]/ML
10 INJECTION, SOLUTION INTRAMUSCULAR; INTRAVENOUS PRN
Status: DISCONTINUED | OUTPATIENT
Start: 2021-07-16 | End: 2021-07-18 | Stop reason: HOSPADM

## 2021-07-16 RX ORDER — OXYTOCIN/0.9 % SODIUM CHLORIDE 30/500 ML
340 PLASTIC BAG, INJECTION (ML) INTRAVENOUS CONTINUOUS PRN
Status: DISCONTINUED | OUTPATIENT
Start: 2021-07-16 | End: 2021-07-18 | Stop reason: HOSPADM

## 2021-07-16 RX ORDER — MISOPROSTOL 200 UG/1
800 TABLET ORAL PRN
Status: DISCONTINUED | OUTPATIENT
Start: 2021-07-16 | End: 2021-07-16 | Stop reason: HOSPADM

## 2021-07-16 RX ORDER — SCOLOPAMINE TRANSDERMAL SYSTEM 1 MG/1
1 PATCH, EXTENDED RELEASE TRANSDERMAL ONCE
Status: DISCONTINUED | OUTPATIENT
Start: 2021-07-16 | End: 2021-07-16 | Stop reason: HOSPADM

## 2021-07-16 RX ORDER — PROCHLORPERAZINE MALEATE 10 MG
10 TABLET ORAL EVERY 6 HOURS PRN
Status: DISCONTINUED | OUTPATIENT
Start: 2021-07-16 | End: 2021-07-16 | Stop reason: HOSPADM

## 2021-07-16 RX ORDER — PROCHLORPERAZINE 25 MG
25 SUPPOSITORY, RECTAL RECTAL EVERY 12 HOURS PRN
Status: DISCONTINUED | OUTPATIENT
Start: 2021-07-16 | End: 2021-07-16 | Stop reason: HOSPADM

## 2021-07-16 RX ORDER — FENTANYL CITRATE 50 UG/ML
50-100 INJECTION, SOLUTION INTRAMUSCULAR; INTRAVENOUS
Status: DISCONTINUED | OUTPATIENT
Start: 2021-07-16 | End: 2021-07-16 | Stop reason: HOSPADM

## 2021-07-16 RX ORDER — METOCLOPRAMIDE 10 MG/1
10 TABLET ORAL EVERY 6 HOURS PRN
Status: DISCONTINUED | OUTPATIENT
Start: 2021-07-16 | End: 2021-07-16 | Stop reason: HOSPADM

## 2021-07-16 RX ORDER — KETOROLAC TROMETHAMINE 30 MG/ML
30 INJECTION, SOLUTION INTRAMUSCULAR; INTRAVENOUS
Status: DISCONTINUED | OUTPATIENT
Start: 2021-07-16 | End: 2021-07-16

## 2021-07-16 RX ORDER — NALOXONE HYDROCHLORIDE 0.4 MG/ML
0.4 INJECTION, SOLUTION INTRAMUSCULAR; INTRAVENOUS; SUBCUTANEOUS
Status: DISCONTINUED | OUTPATIENT
Start: 2021-07-16 | End: 2021-07-18 | Stop reason: HOSPADM

## 2021-07-16 RX ORDER — OXYTOCIN/0.9 % SODIUM CHLORIDE 30/500 ML
1-24 PLASTIC BAG, INJECTION (ML) INTRAVENOUS CONTINUOUS
Status: DISCONTINUED | OUTPATIENT
Start: 2021-07-16 | End: 2021-07-16 | Stop reason: HOSPADM

## 2021-07-16 RX ORDER — METOCLOPRAMIDE HYDROCHLORIDE 5 MG/ML
10 INJECTION INTRAMUSCULAR; INTRAVENOUS EVERY 6 HOURS PRN
Status: DISCONTINUED | OUTPATIENT
Start: 2021-07-16 | End: 2021-07-16 | Stop reason: HOSPADM

## 2021-07-16 RX ORDER — NALOXONE HYDROCHLORIDE 0.4 MG/ML
0.2 INJECTION, SOLUTION INTRAMUSCULAR; INTRAVENOUS; SUBCUTANEOUS
Status: DISCONTINUED | OUTPATIENT
Start: 2021-07-16 | End: 2021-07-16 | Stop reason: HOSPADM

## 2021-07-16 RX ORDER — BUPIVACAINE HYDROCHLORIDE 2.5 MG/ML
15 INJECTION, SOLUTION EPIDURAL; INFILTRATION; INTRACAUDAL ONCE
Status: DISCONTINUED | OUTPATIENT
Start: 2021-07-16 | End: 2021-07-16 | Stop reason: HOSPADM

## 2021-07-16 RX ORDER — BUPIVACAINE HCL/0.9 % NACL/PF 0.125 %
PREFILLED PUMP RESERVOIR EPIDURAL
Status: DISCONTINUED | OUTPATIENT
Start: 2021-07-16 | End: 2021-07-16 | Stop reason: ALTCHOICE

## 2021-07-16 RX ORDER — OXYTOCIN/0.9 % SODIUM CHLORIDE 30/500 ML
340 PLASTIC BAG, INJECTION (ML) INTRAVENOUS CONTINUOUS PRN
Status: DISCONTINUED | OUTPATIENT
Start: 2021-07-16 | End: 2021-07-16 | Stop reason: HOSPADM

## 2021-07-16 RX ORDER — OXYTOCIN 10 [USP'U]/ML
10 INJECTION, SOLUTION INTRAMUSCULAR; INTRAVENOUS PRN
Status: DISCONTINUED | OUTPATIENT
Start: 2021-07-16 | End: 2021-07-16 | Stop reason: HOSPADM

## 2021-07-16 RX ORDER — ACETAMINOPHEN 325 MG/1
650 TABLET ORAL EVERY 4 HOURS PRN
Status: DISCONTINUED | OUTPATIENT
Start: 2021-07-16 | End: 2021-07-18 | Stop reason: HOSPADM

## 2021-07-16 RX ORDER — CYCLOBENZAPRINE HCL 5 MG
5-10 TABLET ORAL EVERY 8 HOURS PRN
Status: DISCONTINUED | OUTPATIENT
Start: 2021-07-16 | End: 2021-07-18 | Stop reason: HOSPADM

## 2021-07-16 RX ORDER — IBUPROFEN 800 MG/1
800 TABLET, FILM COATED ORAL EVERY 6 HOURS PRN
Status: DISCONTINUED | OUTPATIENT
Start: 2021-07-16 | End: 2021-07-18 | Stop reason: HOSPADM

## 2021-07-16 RX ORDER — METHYLERGONOVINE MALEATE 0.2 MG/ML
200 INJECTION INTRAVENOUS
Status: DISCONTINUED | OUTPATIENT
Start: 2021-07-16 | End: 2021-07-16 | Stop reason: HOSPADM

## 2021-07-16 RX ORDER — OXYTOCIN 10 [USP'U]/ML
10 INJECTION, SOLUTION INTRAMUSCULAR; INTRAVENOUS ONCE
Status: DISCONTINUED | OUTPATIENT
Start: 2021-07-16 | End: 2021-07-16

## 2021-07-16 RX ORDER — MODIFIED LANOLIN
OINTMENT (GRAM) TOPICAL
Status: DISCONTINUED | OUTPATIENT
Start: 2021-07-16 | End: 2021-07-18 | Stop reason: HOSPADM

## 2021-07-16 RX ORDER — TRANEXAMIC ACID 10 MG/ML
1 INJECTION, SOLUTION INTRAVENOUS EVERY 30 MIN PRN
Status: DISCONTINUED | OUTPATIENT
Start: 2021-07-16 | End: 2021-07-16 | Stop reason: HOSPADM

## 2021-07-16 RX ORDER — IBUPROFEN 600 MG/1
600 TABLET, FILM COATED ORAL
Status: DISCONTINUED | OUTPATIENT
Start: 2021-07-16 | End: 2021-07-16

## 2021-07-16 RX ORDER — NALBUPHINE HYDROCHLORIDE 10 MG/ML
2.5-5 INJECTION, SOLUTION INTRAMUSCULAR; INTRAVENOUS; SUBCUTANEOUS EVERY 6 HOURS PRN
Status: DISCONTINUED | OUTPATIENT
Start: 2021-07-16 | End: 2021-07-16

## 2021-07-16 RX ORDER — HYDROCODONE BITARTRATE AND ACETAMINOPHEN 5; 325 MG/1; MG/1
1-2 TABLET ORAL EVERY 6 HOURS PRN
Status: DISCONTINUED | OUTPATIENT
Start: 2021-07-16 | End: 2021-07-18 | Stop reason: HOSPADM

## 2021-07-16 RX ORDER — MISOPROSTOL 200 UG/1
400 TABLET ORAL PRN
Status: DISCONTINUED | OUTPATIENT
Start: 2021-07-16 | End: 2021-07-18 | Stop reason: HOSPADM

## 2021-07-16 RX ORDER — NALOXONE HYDROCHLORIDE 0.4 MG/ML
0.2 INJECTION, SOLUTION INTRAMUSCULAR; INTRAVENOUS; SUBCUTANEOUS
Status: DISCONTINUED | OUTPATIENT
Start: 2021-07-16 | End: 2021-07-18 | Stop reason: HOSPADM

## 2021-07-16 RX ORDER — ONDANSETRON 4 MG/1
4 TABLET, ORALLY DISINTEGRATING ORAL EVERY 6 HOURS PRN
Status: DISCONTINUED | OUTPATIENT
Start: 2021-07-16 | End: 2021-07-16 | Stop reason: HOSPADM

## 2021-07-16 RX ORDER — CARBOPROST TROMETHAMINE 250 UG/ML
250 INJECTION, SOLUTION INTRAMUSCULAR
Status: DISCONTINUED | OUTPATIENT
Start: 2021-07-16 | End: 2021-07-16 | Stop reason: HOSPADM

## 2021-07-16 RX ORDER — TRANEXAMIC ACID 10 MG/ML
1 INJECTION, SOLUTION INTRAVENOUS EVERY 30 MIN PRN
Status: DISCONTINUED | OUTPATIENT
Start: 2021-07-16 | End: 2021-07-18 | Stop reason: HOSPADM

## 2021-07-16 RX ORDER — METHYLERGONOVINE MALEATE 0.2 MG/ML
200 INJECTION INTRAVENOUS
Status: DISCONTINUED | OUTPATIENT
Start: 2021-07-16 | End: 2021-07-18 | Stop reason: HOSPADM

## 2021-07-16 RX ORDER — MISOPROSTOL 200 UG/1
400 TABLET ORAL PRN
Status: DISCONTINUED | OUTPATIENT
Start: 2021-07-16 | End: 2021-07-16 | Stop reason: HOSPADM

## 2021-07-16 RX ORDER — BISACODYL 10 MG
10 SUPPOSITORY, RECTAL RECTAL DAILY PRN
Status: DISCONTINUED | OUTPATIENT
Start: 2021-07-16 | End: 2021-07-18 | Stop reason: HOSPADM

## 2021-07-16 RX ADMIN — SODIUM CHLORIDE, POTASSIUM CHLORIDE, SODIUM LACTATE AND CALCIUM CHLORIDE 1000 ML: 600; 310; 30; 20 INJECTION, SOLUTION INTRAVENOUS at 11:59

## 2021-07-16 RX ADMIN — HYDROCODONE BITARTRATE AND ACETAMINOPHEN 1 TABLET: 5; 325 TABLET ORAL at 20:45

## 2021-07-16 RX ADMIN — IBUPROFEN 800 MG: 800 TABLET, FILM COATED ORAL at 18:52

## 2021-07-16 RX ADMIN — Medication: at 12:00

## 2021-07-16 RX ADMIN — DOCUSATE SODIUM 100 MG: 100 CAPSULE, LIQUID FILLED ORAL at 18:52

## 2021-07-16 RX ADMIN — ACETAMINOPHEN 650 MG: 325 TABLET, FILM COATED ORAL at 16:54

## 2021-07-16 RX ADMIN — ONDANSETRON 4 MG: 2 INJECTION INTRAMUSCULAR; INTRAVENOUS at 14:10

## 2021-07-16 RX ADMIN — Medication 2 MILLI-UNITS/MIN: at 08:51

## 2021-07-16 ASSESSMENT — MIFFLIN-ST. JEOR: SCORE: 1626.73

## 2021-07-16 NOTE — PROVIDER NOTIFICATION
07/16/21 0900   Provider Notification   Provider Name/Title fei   Method of Notification In Department   Notification Reason Patient Arrived;Status Update

## 2021-07-16 NOTE — PROGRESS NOTES
"[facility]   OB PROGRESS NOTE     S. Pt states she is doing well overall. Feels some back pain . +FM    /55   Temp 97.7  F (36.5  C) (Oral)   Resp 16   Ht 1.575 m (5' 2\")   Wt 94.3 kg (208 lb)   Breastfeeding Unknown   BMI 38.04 kg/m      Fetal Heart Tones: 140 baseline, moderate variablility, + accels, no decels and Category I  TOCO: frequency q 2-3 minutes  Cervical Exam: 8.5/ 90/ Anterior/ soft/ -1     A/P Gutierrez Mar is a 30 year old  at Unknown here with     1- induction for IUGR  - continue with pitocin and plan on vaginal delivery    - FHT Cat I  - continue monitoring    Dr. Tasha Gan DO        "

## 2021-07-16 NOTE — ANESTHESIA PREPROCEDURE EVALUATION
Anesthesia Pre-Procedure Evaluation    Patient: Gutierrez Mar   MRN: [id number] : 1990        Preoperative Diagnosis: * No surgery found *   Procedure :      Past Medical History:   Diagnosis Date     C. difficile diarrhea      Depressive disorder     Hx ppd     Sickle cell trait (H)       Past Surgical History:   Procedure Laterality Date     COSMETIC SURGERY       MAMMOPLASTY AUGMENTATION  2016      Allergies   Allergen Reactions     Clindamycin Anaphylaxis     Keflet [Cephalexin] GI Disturbance and Diarrhea     Leading to C-diff     Oxycodone Nausea     Percocet ok. Just straight Oxycodone makes her nauseated.       Social History     Tobacco Use     Smoking status: Never Smoker     Smokeless tobacco: Never Used   Substance Use Topics     Alcohol use: No     Alcohol/week: 0.0 standard drinks      Wt Readings from Last 1 Encounters:   21 94.3 kg (208 lb)        Anesthesia Evaluation   Pt has had prior anesthetic. Type: General.    No history of anesthetic complications       ROS/MED HX  ENT/Pulmonary:  - neg pulmonary ROS     Neurologic:  - neg neurologic ROS     Cardiovascular:  - neg cardiovascular ROS     METS/Exercise Tolerance:     Hematologic:  - neg hematologic  ROS     Musculoskeletal:       GI/Hepatic:  - neg GI/hepatic ROS     Renal/Genitourinary:       Endo: Comment: Class 2 obesity    (+) Obesity,     Psychiatric/Substance Use:  - neg psychiatric ROS     Infectious Disease:       Malignancy:       Other:     (-) previous  and TOLAC candidate       Physical Exam    Airway        Mallampati: II   TM distance: > 3 FB   Neck ROM: full   Mouth opening: > 3 cm    Respiratory Devices and Support         Dental  no notable dental history         Cardiovascular   cardiovascular exam normal          Pulmonary   pulmonary exam normal            Other findings: Lab Test        07/16/21     01/10/21     10/12/20     12/27/19     04/11/17     08/03/16                       0921           1424          1734          1354          1632          1400          WBC           --          5.4          5.7          4.6          4.8          4.6           HGB          11.0*        11.0*        11.1*        12.4         11.6*        12.3          MCV           --          69*          68*          69*          69*          67*           PLT           --          260          262          245          251          238           INR           --           --           --           --          1.04         1.08           Lab Test        12/27/19 01/25/18 04/11/17                       1354          1423          1632          NA           140          135          140           POTASSIUM    4.1          3.5          3.9           CHLORIDE     108          104          107           CO2          28           23           23            BUN          10           7            12            CR           0.84         0.89         0.80          ANIONGAP     4            8            10            HAI          9.0          7.9*         8.9           GLC          83           89           78                       OUTSIDE LABS:  CBC:   Lab Results   Component Value Date    WBC 5.4 01/10/2021    WBC 5.7 10/12/2020    HGB 11.0 (L) 07/16/2021    HGB 11.0 (L) 01/10/2021    HCT 35.9 01/10/2021    HCT 35.2 10/12/2020     01/10/2021     10/12/2020     BMP:   Lab Results   Component Value Date     12/27/2019     01/25/2018    POTASSIUM 4.1 12/27/2019    POTASSIUM 3.5 01/25/2018    CHLORIDE 108 12/27/2019    CHLORIDE 104 01/25/2018    CO2 28 12/27/2019    CO2 23 01/25/2018    BUN 10 12/27/2019    BUN 7 01/25/2018    CR 0.84 12/27/2019    CR 0.89 01/25/2018    GLC 83 12/27/2019    GLC 89 01/25/2018     COAGS:   Lab Results   Component Value Date    PTT 31 08/03/2016    INR 1.04 04/11/2017    FIBR 366 08/03/2016     POC:   Lab Results   Component Value Date    HCG Negative 12/27/2019    HCGS  Negative 08/22/2016     HEPATIC:   Lab Results   Component Value Date    ALBUMIN 4.0 04/11/2017    PROTTOTAL 7.8 04/11/2017    ALT 20 04/11/2017    AST 15 04/11/2017    ALKPHOS 51 04/11/2017    BILITOTAL 0.3 04/11/2017     OTHER:   Lab Results   Component Value Date    HAI 9.0 12/27/2019    LIPASE 93 05/10/2015    TSH 0.63 04/11/2017    T4 0.99 04/11/2017       Anesthesia Plan    ASA Status:  2      Anesthesia Type: Epidural.              Consents    Anesthesia Plan(s) and associated risks, benefits, and realistic alternatives discussed. Questions answered and patient/representative(s) expressed understanding.     - Discussed with:  Patient         Postoperative Care            Comments:           neg OB ROS.       Bart Zaldivar MD

## 2021-07-16 NOTE — PROVIDER NOTIFICATION
07/16/21 0930   Provider Notification   Provider Name/Title fei   Method of Notification At Bedside   Notification Reason Status Update   strip reviewed POC reviewed with pt

## 2021-07-16 NOTE — H&P
[facility]     History and Physical  Obstetrics and Gynecology     Date of Admission:  2021    History of Present Illness   Gutierrez Mar is a 30 year old female  39w0d US with Estimated Date of Delivery: 21 who presents for induction for IUGR    She presents to the [facility] for induction of labor.  Indication IUGR.    PRENATAL COURSE  Prenatal care was received at [facility] and the  course was complicated by Borderline IUGR (6/15, EFW 9%, AC 11%), GI issues, echogenic bowel, beta thalassemia minor trait, and history of precipitous delivery.         Recent Labs   Lab Test 10/12/20  1734   ABO A   RH Pos   AS Neg     Rhogam not indicated   Recent Labs   Lab Test 13  1557 13  0000   HEPBANG Negative  --    GBS  --  postive       Past Medical History    Past medical history reviewed      Past Surgical History   Past surgical history reviewed      Prior to Admission Medications   Prior to Admission Medications   Prescriptions Last Dose Informant Patient Reported? Taking?   acetaminophen (TYLENOL) 325 MG tablet Unknown at Unknown time  Yes No   Sig: Take 325-650 mg by mouth as needed    polyethylene glycol (MIRALAX) 17 g packet Past Month at Unknown time  Yes Yes   Sig: Take 1 packet by mouth daily   sodium citrate-citric acid (BICITRA) 500-334 MG/5ML solution   No No   Sig: Take 30 mLs by mouth 3 times daily (with meals)      Facility-Administered Medications: None     Allergies   Allergies   Allergen Reactions     Clindamycin Anaphylaxis     Keflet [Cephalexin] GI Disturbance and Diarrhea     Leading to C-diff     Oxycodone Nausea     Percocet ok. Just straight Oxycodone makes her nauseated.        Social History   I have personally reviewed the social history      Family History   Family history reviewed with patient and is noncontributory.    Immunization History   Immunizations are up to date    Physical Exam       BP:  138/85     Resp: 16        Vital Signs with Ranges  Resp:  [16] 16  BP: (138)/(85) 138/85  Fetal Heart Tones: 140 baseline, moderate variablility, no accels and no decels  TOCO: irregular    Constitutional: healthy, alert and active   Respiratory: No increased work of breathing, good air exchange, clear to auscultation bilaterally, no crackles or wheezing  Cardiovascular: Normal apical impulse, regular rate and rhythm, normal S1 and S2, no S3 or S4, and no murmur noted  Abdomen: gravid, single vertex fetus, non-tender, EFW 6 lbs 0  Cervical Exam: / Posterior/ soft/ -3       Assessment & Plan   Gutierrez Mar is a 30 year old female  who presents at 39w0d with induction for IUGR.   GBS negative.   NST reactive.       Admit - see IP orders  Labor induction with Pitocin  Pain medication (plans on epidural)   Anticipate     Tasha Gan, , DO

## 2021-07-16 NOTE — ANESTHESIA PROCEDURE NOTES
Epidural catheter Procedure Note  Pre-Procedure   Staff -        Performed By: anesthesiologist  Procedure DocumentationProcedure: epidural catheter  Comments:  Pre-Procedure  Performed by Christian Treadwell MD  Location: OB.      PreAnesthestic Checklist: patient identified, IV checked, risks and benefits discussed, informed consent obtained, monitors and equipment checked, pre-op evaluation and at physician/surgeon's request.    Timeout   Correct Patient: Yes  Correct Procedure: Epidural catheter placement  Correct Site: Yes   Correct Position: Yes    Procedure Documentation  Procedure:   Epidural catheter block for Labor    Patient currently in labor and she and OBMD request a labor epidural to control her labor pains. Patient was interviewed and examined. Procedure and risks including but not limited to bleeding, infection, nerve injury, paralysis, PDPH, and inadequate block requiring intervention discussed with patient. Questions answered. This epidural is to be placed in anticipation of vaginal delivery.  She consents to the epidural procedure.  Time-out was performed.  I or my partners remain immediately available for management of any issues or complications and will monitor at appropriate intervals.  Procedure: Patient sitting. Betadine prep x 3. Sterile drape applied.  Lidocaine 1%  local infiltration at L 3-4.  17 G. Tuohy needle at L3-4 by loss of resistance into epidural space.  No CSF, paresthesia or blood. 1.5 % Lidocaine with 1:200,000 Epinephrine 5cc test dose. Then 0.25% bupivicaine 10 cc with NS 5 cc.  Epidural catheter inserted w/o resistance to 5 cm in epidural space.  Aspiration negative for blood and CSF.   Negative for neuro change, paresthesia or symptoms of intravascular injection or intrathecal injection.  Infusion orders written and infusion of 0.125% bupivicaine 15cc per hour started.    Christian Treadwell MD

## 2021-07-16 NOTE — PROVIDER NOTIFICATION
07/16/21 1300   Provider Notification   Provider Name/Title schlies   Method of Notification At Bedside   Request Evaluate in Person   Notification Reason SVE        07/16/21 1300   Provider Notification   Provider Name/Title schlies   Method of Notification At Bedside   Request Evaluate in Person   Notification Reason SVE        07/16/21 1300   Provider Notification   Provider Name/Title schlies   Method of Notification At Bedside   Request Evaluate in Person   Notification Reason SVE   SVE, strip reviewed

## 2021-07-16 NOTE — PROVIDER NOTIFICATION
07/16/21 1117   Provider Notification   Provider Name/Title danna   Method of Notification At Bedside   Request Evaluate in Person   Notification Reason Pain   verbally approved hour limit to be @ 20ml not 25ml

## 2021-07-16 NOTE — L&D DELIVERY NOTE
Gutierrez Mar is a 30 year old-year-old  female,  6 para 2  with EDC 21, who was admitted for induction for IUGR at 39 weeks gestation.    Her prenatal care was at the [facility] in [place]. Prenatal course was complicated by Borderline IUGR (6/15, EFW 9%, AC 11%), GI issues, echogenic bowel, beta thalassemia minor trait, and history of precipitous delivery. . Vaginal Group B Streptococcus culture was negative.  Oxytocin induction/augmentation was initiated per standard protocol for absent/inadequate labor.  Patient received an epidural/narcotic injection for pain relief.  The patient achieved complete dilation at 1440. She went on to deliver a 6 #, 0 oz female infant at 1442 by . Apgars 8 at one minute and 9 at five minutes. The fetal oropharynx was bulb suctioned on the perineum.  There was one nuchal cord and easily reduced. The placenta delivered spontaneously and intact at 1445.  The patient had no lacerations.    EBL for the delivery was 100cc.    Dr. Tasha Gan DO

## 2021-07-17 PROCEDURE — 120N000001 HC R&B MED SURG/OB

## 2021-07-17 PROCEDURE — 250N000013 HC RX MED GY IP 250 OP 250 PS 637: Performed by: OBSTETRICS & GYNECOLOGY

## 2021-07-17 RX ORDER — SENNA AND DOCUSATE SODIUM 50; 8.6 MG/1; MG/1
1 TABLET, FILM COATED ORAL 2 TIMES DAILY PRN
Qty: 10 TABLET | Refills: 1 | Status: SHIPPED | OUTPATIENT
Start: 2021-07-17

## 2021-07-17 RX ORDER — IBUPROFEN 800 MG/1
800 TABLET, FILM COATED ORAL EVERY 6 HOURS PRN
Qty: 30 TABLET | Refills: 1 | Status: SHIPPED | OUTPATIENT
Start: 2021-07-17

## 2021-07-17 RX ORDER — ACETAMINOPHEN 325 MG/1
975 TABLET ORAL EVERY 6 HOURS PRN
Qty: 60 TABLET | Refills: 1 | Status: SHIPPED | OUTPATIENT
Start: 2021-07-17

## 2021-07-17 RX ADMIN — HYDROCODONE BITARTRATE AND ACETAMINOPHEN 1 TABLET: 5; 325 TABLET ORAL at 20:58

## 2021-07-17 RX ADMIN — DOCUSATE SODIUM 100 MG: 100 CAPSULE, LIQUID FILLED ORAL at 09:58

## 2021-07-17 RX ADMIN — HYDROCODONE BITARTRATE AND ACETAMINOPHEN 1 TABLET: 5; 325 TABLET ORAL at 02:40

## 2021-07-17 RX ADMIN — IBUPROFEN 800 MG: 800 TABLET, FILM COATED ORAL at 23:47

## 2021-07-17 RX ADMIN — IBUPROFEN 800 MG: 800 TABLET, FILM COATED ORAL at 00:39

## 2021-07-17 RX ADMIN — IBUPROFEN 800 MG: 800 TABLET, FILM COATED ORAL at 17:46

## 2021-07-17 RX ADMIN — IBUPROFEN 800 MG: 800 TABLET, FILM COATED ORAL at 07:14

## 2021-07-17 NOTE — PLAN OF CARE
VSS, pt pain controlled with ibuprofen, denied pain interventions in early afternoon, assisted with breast feeding, talked about discharge expectations and the paperwork, answered questions.

## 2021-07-17 NOTE — PLAN OF CARE
VSS, fundus and lochia WDL.  Uterine cramping controlled with ibuprofen and norco; recommended frequent voiding as well.  Patient is ambulating independently and voiding adequately.  FOB at bedside.    Patient is breast feeding her  with a nipple shield, and has struggled overnight with baby being very sleepy and unwilling to suck at breast even with assistance to stimulate baby.  Patient is able to express drops of colostrum, and these were expressed to baby's mouth during feeding attempts.  Encouraged to continue feeding attempts at least q 2-3 hours, and to call nurse for assistance with feeds.  Discussed availability of lactation consult (patient desires and this was ordered), and potentially pumping to stimulate milk supply if baby does not begin sucking more at breast today.

## 2021-07-17 NOTE — PROVIDER NOTIFICATION
07/16/21 2028   Provider Notification   Provider Name/Title dr. Gan   Method of Notification Phone   Notification Reason Medication Request   PT has back pain not relieve with tylenol and ibuprofen rated 7. Back pain , cramping and feels like labor pain per pt.  Pt requesting stronger medicine . MD will put orders. Do not give tylenol if pt taking NORCO.

## 2021-07-17 NOTE — PROGRESS NOTES
[facility]   Post-partum Note    Name:  Gutierrez Mar  MRN: [id number]    S: Patient states she is doing well overall.  Pain is better controlled today than yesterday.  Tolerating regular diet without nausea or vomiting. Voiding spontaneously, passing flatus but no bowel movement as of yet.   Ambulating without dizziness.  Lochia similar to menses.  Breast feeding. Desires Nexplanon for contraception.  Plans discharge tonight if possible.    O:   Patient Vitals for the past 24 hrs:   BP Temp Temp src Pulse Resp   07/17/21 0747 123/49 97.4  F (36.3  C) Oral 91 16   07/17/21 0039 122/60 97.9  F (36.6  C) Oral 66 18   07/16/21 1941 133/75 97.1  F (36.2  C) Oral 86 18   07/16/21 1645 127/66 -- -- -- --   07/16/21 1630 135/67 -- -- -- --   07/16/21 1600 134/63 -- -- -- --   07/16/21 1545 133/80 -- -- -- --   07/16/21 1530 129/63 -- -- -- --   07/16/21 1515 132/61 -- -- -- --   07/16/21 1500 129/62 -- -- -- --   07/16/21 1400 -- 97.4  F (36.3  C) Oral -- --   07/16/21 1332 112/65 -- -- -- --   07/16/21 1331 112/65 -- -- -- --   07/16/21 1328 112/65 -- -- -- --   07/16/21 1300 110/62 97.8  F (36.6  C) Oral -- 16   07/16/21 1232 111/60 -- -- -- --   07/16/21 1230 -- 97.7  F (36.5  C) Oral -- --   07/16/21 1159 105/55 -- -- -- --   07/16/21 1157 114/62 -- -- -- --   07/16/21 1156 108/60 -- -- -- 16 07/16/21 1151 128/79 -- -- -- --   07/16/21 1147 129/68 -- -- -- --   07/16/21 1145 (!) 119/97 -- -- -- --   07/16/21 1143 118/61 -- -- -- --   07/16/21 1141 115/56 -- -- -- --   07/16/21 1139 113/56 -- -- -- --   07/16/21 1137 114/56 -- -- -- --   07/16/21 1135 113/56 -- -- -- --   07/16/21 1133 113/55 -- -- -- --   07/16/21 1131 117/56 -- -- -- --   07/16/21 1129 116/58 -- -- -- --   07/16/21 1127 118/57 -- -- -- --   07/16/21 1125 114/62 -- -- -- 16 07/16/21 1123 114/56 -- -- -- 16 07/16/21 1121 116/59 -- -- -- 16 07/16/21 1119 117/64 -- -- -- 16 07/16/21 1117 123/69 -- -- -- 16 07/16/21 0940  112/59 -- -- -- --     Gen:  Resting comfortably, NAD  CV:  Regular rate  Pulm:  Non-labored breathing.  No cough or wheezing.   Abd:  Soft, appropriately tender to palpation, non-distended.  Fundus at -1 umbilicus, firm and non-tender.  Ext:  Non-tender, 1+ LE edema b/l    Assessment/Plan:  30 year old  on PPD #1 s/p  following IOL for FGR.  Continue with routine postpartum management.     Pregnancy c/b: Borderline IUGR (6/15, EFW 9%, AC 11%), GI issues, echogenic bowel, beta thalassemia minor trait, and history of precipitous delivery.    Pain: Well-controlled with ibuprofen and tylenol  Hgb: 11.0>. VSS as noted above, asymptomatic.   GI:  BID Senna/Colace.  PRN Simethicone.   PPx:  Encouraged ambulation   Rh: Positive  Rubella: Immune   Feed: Breast  BC: Nexplanon  Dispo: Plan for home on PPD#1.     Sara Jaeger MD   Pager: [phone]   2021

## 2021-07-17 NOTE — DISCHARGE SUMMARY
[facility] Dis[facility] Summary    Gutierrez Mar MRN# [id number]   Age: 30 year old YOB: 1990     Date of Admission:  2021  Date of Discharge::  21  Admitting Physician:  Tasha Gan DO  Discharge Physician:  Tasha Gan,             Admission Diagnoses:     IUP at 39w0d    FGR    Grand multiparity    Beta thalassemia minor trait    H/o precipitous delivery             Discharge Diagnosis:       IUP at 39w0d, now delivered          Procedures:     Procedure(s):       Epidural           Medications Prior to Admission:     Medications Prior to Admission   Medication Sig Dispense Refill Last Dose     polyethylene glycol (MIRALAX) 17 g packet Take 1 packet by mouth daily   Past Month at Unknown time     [DISCONTINUED] acetaminophen (TYLENOL) 325 MG tablet Take 325-650 mg by mouth as needed    Unknown at Unknown time     [DISCONTINUED] sodium citrate-citric acid (BICITRA) 500-334 MG/5ML solution Take 30 mLs by mouth 3 times daily (with meals) 300 mL 3              Discharge Medications:        Review of your medicines      START taking      Dose / Directions   ibuprofen 800 MG tablet  Commonly known as: ADVIL/MOTRIN  Used for:  (spontaneous vaginal delivery)      Dose: 800 mg  Take 1 tablet (800 mg) by mouth every 6 hours as needed for other or moderate pain (cramping)  Quantity: 30 tablet  Refills: 1     SENNA-docusate sodium 8.6-50 MG tablet  Commonly known as: SENNA S  Used for:  (spontaneous vaginal delivery)      Dose: 1 tablet  Take 1 tablet by mouth 2 times daily as needed (Constipation)  Quantity: 10 tablet  Refills: 1        CONTINUE these medicines which may have CHANGED, or have new prescriptions. If we are uncertain of the size of tablets/capsules you have at home, strength may be listed as something that might have changed.      Dose / Directions   acetaminophen 325 MG tablet  Commonly known as: TYLENOL  This may have changed:     how much to  take    when to take this    reasons to take this  Used for:  (spontaneous vaginal delivery)      Dose: 975 mg  Take 3 tablets (975 mg) by mouth every 6 hours as needed for mild pain  Quantity: 60 tablet  Refills: 1        CONTINUE these medicines which have NOT CHANGED      Dose / Directions   polyethylene glycol 17 g packet  Commonly known as: MIRALAX      Dose: 1 packet  Take 1 packet by mouth daily  Refills: 0        STOP taking    sodium citrate-citric acid 500-334 MG/5ML solution  Commonly known as: BICITRA              Where to get your medicines      These medications were sent to [facility] [place], [place] - [address] [address]  [address] [facility] [address]    Phone: [phone]     acetaminophen 325 MG tablet    ibuprofen 800 MG tablet    SENNA-docusate sodium 8.6-50 MG tablet                Consultations:   None          Brief Admission History and Intrapartum Course:     Gutierrez Mar is a 30 year old-year-old  female,  6 para 2  with EDC 21, who was admitted for induction for IUGR at 39 weeks gestation.    Her prenatal care was at the [facility] in [place]. Prenatal course was complicated by Borderline IUGR (6/15, EFW 9%, AC 11%), GI issues, echogenic bowel, beta thalassemia minor trait, and history of precipitous delivery. . Vaginal Group B Streptococcus culture was negative.  Oxytocin induction/augmentation was initiated per standard protocol for absent/inadequate labor.  Patient received an epidural/narcotic injection for pain relief.  The patient achieved complete dilation at 1440. She went on to deliver a 6 #, 0 oz female infant at 1442 by . Apgars 8 at one minute and 9 at five minutes. The fetal oropharynx was bulb suctioned on the perineum.  There was one nuchal cord and easily reduced. The placenta delivered spontaneously and intact at 1445.  The patient had no lacerations.    EBL for the delivery was  100cc.    Delivery Summary    Gutierrez Mar MRN# [id number]   Age: 30 year old YOB: 1990          Zaid, Female-Gutierrez [[id number]]    Labor Event Times    Labor onset date: 21 Onset time: 11:00 AM   Dilation complete date: 21 Complete time:  2:30 PM   Start pushing date/time: 2021 1440      Labor Length    1st Stage (hrs): 3 (min): 30   2nd Stage (hrs): 0 (min): 11   3rd Stage (hrs): 0 (min): 4      Labor Events     labor?: No   steroids: None  Labor Type: Induction/Cervical ripening  Predominate monitoring during 1st stage: continuous electronic fetal monitoring     Antibiotics received during labor?: No     Rupture date/time: 21 1158   Rupture type: Spontaneous rupture of membranes occuring during spontaneous labor or augmentation     Induction date/time:     Cervical ripening date/time:     Indications for induction: Fetal Abnormality     Augmentation: Oxytocin  1:1 continuous labor support provided by?: RN       Delivery/Placenta Date and Time    Delivery Date: 21 Delivery Time:  2:41 PM   Placenta Date/Time: 2021  2:45 PM  Oxytocin given at the time of delivery: after delivery of baby  Delivering clinician: Tasha Gan,    Other personnel present at delivery:  Provider Role   Racheal Alejandro RN          Vaginal Counts     Initial count performed by 2 team members:  Two Team Members   Dr. Malik VUONG RN       [place] Suture Needles Sponges (RETIRED) Instruments   Initial counts 2  5    Added to count       Relief counts       Final counts 2  5          Placed during labor Accounted for at the end of labor   FSE No NA   IUPC No NA   Cervadil No NA              Final count performed by 2 team members:  Two Team Members   Dr. Malik alejandro rn      Final count correct?: Yes     Apgars    Living status: Living   1 Minute 5 Minute 10 Minute 15 Minute 20 Minute   Skin color: 0  1       Heart rate: 2  2       Reflex  "irritability: 2  2       Muscle tone: 2  2       Respiratory effort: 2  2       Total: 8  9       Apgars assigned by: TAMIKO VUONG     Cord    Vessels: 3 Vessels    Cord Complications: Nuchal   Nuchal Intervention: reduced         Nuchal cord description: loose nuchal cord         Cord Blood Disposition: Lab    Gases Sent?: No    Delayed cord clamping?: Yes    Cord Clamping Delay (seconds): 31-60 seconds    Stem cell collection?: No        Resuscitation    Methods: None      Measurements    Weight: 5 lb 15.9 oz Length: 1' 6\"   Head circumference: 31.8 cm       Labor Events and Shoulder Dystocia    Fetal Tracing Prior to Delivery: Category 1, Category 2  Shoulder dystocia present?: Neg     Delivery (Maternal) (Provider to Complete) ([id number])    Episiotomy: None  Perineal lacerations: None    Repair suture: None  Genital tract inspection done: Pos     Blood Loss  Mother: Gutierrez Mar R #[id number]   Start of Mother's Information    Delivery Blood Loss  21 1100 - 21 0841    Delivery QBL (mL) Hospital Encounter 100 mL    Total  100 mL         End of Mother's Information  Mother: Ad Marjamaal R #[id number]          Delivery - Provider to Complete ([id number])    Delivering clinician: Tasha Gan,   Attempted Delivery Types (Choose all that apply): Spontaneous Vaginal Delivery  Delivery Type (Choose the 1 that will go to the Birth History): Vaginal, Spontaneous                   Other personnel:  Provider Role   Racheal Reed RN                 Placenta    Date/Time: 2021  2:45 PM  Removal: Spontaneous  Disposition: Pathology           Anesthesia    Method: Epidural                Presentation and Position    Presentation: Vertex    Position: Right Occiput Anterior                        Post-partum Course:   The patient's hospital course was unremarkable.  On discharge, her pain was well controlled. Vaginal bleeding is similar to peak menstrual flow.  Voiding without " difficulty.  Ambulating well and tolerating a normal diet.  No fever.  Breastfeeding well.  Infant is stable.  She was discharged on post-partum day #2.         Discharge Instructions and Follow-Up:     Discharge diet: Regular   Discharge activity: Pelvic rest for 6 weeks including no sexual intercourse, tampons, or douching.    Discharge follow-up: Follow up with your primary OB for a routine postpartum visit in 6 weeks           Discharge Disposition:     Discharged to home      Tasha Gan DO

## 2021-07-17 NOTE — LACTATION NOTE
Lactation in to see patient. Baby has been sleepy not interested in nursing. Attempted to get baby to breast. Unable to latch without shield. Areola firm not able to compress to get in baby's mouth nipples shallow. Medium shield applied baby latched with no suck. Formula placed under and above shield with no sucking. Plan now that baby is over 24 hours. To attempt breastfeeding, pump and supplement if baby did not feed at breast. Mother choosing to do formula after writer did offer doner milk. Patient ok with plan. Attempted to nurse her other children with no success.  Will follow up tomorrow.

## 2021-07-17 NOTE — PLAN OF CARE
Meeting expected goals . Up ad nadege , voiding . Pt is breastpumping  and also using nipple shied when breastfeeding .

## 2021-07-17 NOTE — PLAN OF CARE
Oriented to Pp unit . Instructed to call when going to bathroom since both legs still numb .  Call light within reach.  Pt resting for about an hour   and able to ambulate to bathroom and able to void without difficulty. Both legs now steady.    Mom is using nipple shield when breastfeeding .

## 2021-07-18 VITALS
TEMPERATURE: 97.3 F | HEART RATE: 92 BPM | SYSTOLIC BLOOD PRESSURE: 120 MMHG | WEIGHT: 208 LBS | DIASTOLIC BLOOD PRESSURE: 70 MMHG | RESPIRATION RATE: 16 BRPM | HEIGHT: 62 IN | BODY MASS INDEX: 38.28 KG/M2

## 2021-07-18 PROCEDURE — 250N000013 HC RX MED GY IP 250 OP 250 PS 637: Performed by: OBSTETRICS & GYNECOLOGY

## 2021-07-18 RX ORDER — HYDROCODONE BITARTRATE AND ACETAMINOPHEN 5; 325 MG/1; MG/1
1-2 TABLET ORAL EVERY 6 HOURS PRN
Qty: 5 TABLET | Refills: 0 | Status: SHIPPED | OUTPATIENT
Start: 2021-07-18

## 2021-07-18 RX ADMIN — IBUPROFEN 800 MG: 800 TABLET, FILM COATED ORAL at 05:52

## 2021-07-18 RX ADMIN — HYDROCODONE BITARTRATE AND ACETAMINOPHEN 2 TABLET: 5; 325 TABLET ORAL at 03:04

## 2021-07-18 RX ADMIN — DOCUSATE SODIUM 100 MG: 100 CAPSULE, LIQUID FILLED ORAL at 09:15

## 2021-07-18 NOTE — PLAN OF CARE
VSS, pain controlled with pharmacological interventions, feeding her infant with formula now, discharge education completed and follow up discussed for OB PP visit with pt in 6 weeks; discharged to home with baby in stable condition, handed in the birth certificate and ROP, discharged to home.

## 2021-07-18 NOTE — PROGRESS NOTES
"[facility] Postpartum Note    S:  Gutierrez Mar feels giid this morning. Was able to sleep last night. Pain control adequate. Lochia minimal. Voiding. Pumping for baby. Mood Good.     O:  Vitals were reviewed  Blood pressure 120/70, pulse 92, temperature 97.3  F (36.3  C), temperature source Oral, resp. rate 16, height 1.575 m (5' 2\"), weight 94.3 kg (208 lb), unknown if currently breastfeeding.      General: healthy, alert and no distress  Abd: soft, appropriately tender, fundus firm  Legs: Non-tender, 0+ pitting edema    RH(D)   Date Value Ref Range Status   10/12/2020 Pos  Final         Assessment and Plan:   Postpartum Day #2, status post vaginal delivery, doing well.  -- Discharge home  -- F/U 6 weeks w/ Primary OB  -- Discharge meds: ibuprofen, tylenol, and senna  -- Contraception: nexplanearl Gan, DO, DO  "

## 2021-07-18 NOTE — PLAN OF CARE
Pt meeting expected goals this shift.  Pain managed to pt's satisfaction with oral medications.  Pt is caring for infant independently and without staff assist.  Pt VSS, ambulates with steady gait, and is able to make needs known.  Pt is tolerating regular diet and voiding appropriately - no BM since delivery, is passing flatus.  Pt is pumping intermittently per preference.  Plan is to discharge home today.  Pt denies further needs/concerns at this time - will continue to monitor until transfer of care.

## 2021-07-18 NOTE — LACTATION NOTE
Lactation in to follow up with patient. Gutierrez says baby will latch still not interested in sucking. Encouraged to follow plan of attempt every 3, pump after attempt to increase milk supply if baby not sucking at breasts. Continue to use shield to help with latch, then supplement with any EBM or formula. Patient agreeable with plan. Home today.

## 2021-07-20 LAB
PATH REPORT.COMMENTS IMP SPEC: NORMAL
PATH REPORT.COMMENTS IMP SPEC: NORMAL
PATH REPORT.FINAL DX SPEC: NORMAL
PATH REPORT.GROSS SPEC: NORMAL
PATH REPORT.MICROSCOPIC SPEC OTHER STN: NORMAL
PATH REPORT.RELEVANT HX SPEC: NORMAL
PHOTO IMAGE: NORMAL

## 2021-09-25 ENCOUNTER — HEALTH MAINTENANCE LETTER (OUTPATIENT)
Age: 31
End: 2021-09-25

## 2021-11-01 ENCOUNTER — HOSPITAL ENCOUNTER (EMERGENCY)
Facility: CLINIC | Age: 31
Discharge: HOME | End: 2021-11-01
Attending: EMERGENCY MEDICINE | Admitting: EMERGENCY MEDICINE

## 2021-11-01 VITALS
OXYGEN SATURATION: 98 % | DIASTOLIC BLOOD PRESSURE: 82 MMHG | TEMPERATURE: 98.2 F | HEIGHT: 62 IN | RESPIRATION RATE: 16 BRPM | HEART RATE: 68 BPM | WEIGHT: 188 LBS | BODY MASS INDEX: 34.6 KG/M2 | SYSTOLIC BLOOD PRESSURE: 128 MMHG

## 2021-11-01 DIAGNOSIS — R51.9 NONINTRACTABLE HEADACHE, UNSPECIFIED CHRONICITY PATTERN, UNSPECIFIED HEADACHE TYPE: ICD-10-CM

## 2021-11-01 PROCEDURE — 96361 HYDRATE IV INFUSION ADD-ON: CPT

## 2021-11-01 PROCEDURE — 96365 THER/PROPH/DIAG IV INF INIT: CPT

## 2021-11-01 PROCEDURE — 258N000003 HC RX IP 258 OP 636: Performed by: EMERGENCY MEDICINE

## 2021-11-01 PROCEDURE — 250N000013 HC RX MED GY IP 250 OP 250 PS 637: Performed by: EMERGENCY MEDICINE

## 2021-11-01 PROCEDURE — 99284 EMERGENCY DEPT VISIT MOD MDM: CPT | Mod: 25

## 2021-11-01 PROCEDURE — 250N000011 HC RX IP 250 OP 636: Performed by: EMERGENCY MEDICINE

## 2021-11-01 PROCEDURE — 96375 TX/PRO/DX INJ NEW DRUG ADDON: CPT

## 2021-11-01 RX ORDER — DIPHENHYDRAMINE HYDROCHLORIDE 50 MG/ML
12.5 INJECTION INTRAMUSCULAR; INTRAVENOUS ONCE
Status: COMPLETED | OUTPATIENT
Start: 2021-11-01 | End: 2021-11-01

## 2021-11-01 RX ORDER — SODIUM CHLORIDE 9 MG/ML
INJECTION, SOLUTION INTRAVENOUS CONTINUOUS
Status: DISCONTINUED | OUTPATIENT
Start: 2021-11-01 | End: 2021-11-01 | Stop reason: HOSPADM

## 2021-11-01 RX ORDER — METOCLOPRAMIDE HYDROCHLORIDE 5 MG/ML
10 INJECTION INTRAMUSCULAR; INTRAVENOUS ONCE
Status: COMPLETED | OUTPATIENT
Start: 2021-11-01 | End: 2021-11-01

## 2021-11-01 RX ORDER — ACETAMINOPHEN 500 MG
1000 TABLET ORAL EVERY 4 HOURS PRN
Status: DISCONTINUED | OUTPATIENT
Start: 2021-11-01 | End: 2021-11-01 | Stop reason: HOSPADM

## 2021-11-01 RX ORDER — KETOROLAC TROMETHAMINE 15 MG/ML
15 INJECTION, SOLUTION INTRAMUSCULAR; INTRAVENOUS ONCE
Status: COMPLETED | OUTPATIENT
Start: 2021-11-01 | End: 2021-11-01

## 2021-11-01 RX ORDER — MAGNESIUM SULFATE HEPTAHYDRATE 40 MG/ML
2 INJECTION, SOLUTION INTRAVENOUS ONCE
Status: COMPLETED | OUTPATIENT
Start: 2021-11-01 | End: 2021-11-01

## 2021-11-01 RX ADMIN — SODIUM CHLORIDE 1000 ML: 9 INJECTION, SOLUTION INTRAVENOUS at 10:52

## 2021-11-01 RX ADMIN — METOCLOPRAMIDE HYDROCHLORIDE 10 MG: 5 INJECTION INTRAMUSCULAR; INTRAVENOUS at 10:59

## 2021-11-01 RX ADMIN — ACETAMINOPHEN 1000 MG: 500 TABLET, FILM COATED ORAL at 10:54

## 2021-11-01 RX ADMIN — KETOROLAC TROMETHAMINE 15 MG: 15 INJECTION, SOLUTION INTRAMUSCULAR; INTRAVENOUS at 10:57

## 2021-11-01 RX ADMIN — DIPHENHYDRAMINE HYDROCHLORIDE 12.5 MG: 50 INJECTION INTRAMUSCULAR; INTRAVENOUS at 10:55

## 2021-11-01 RX ADMIN — MAGNESIUM SULFATE HEPTAHYDRATE 2 G: 40 INJECTION, SOLUTION INTRAVENOUS at 11:04

## 2021-11-01 ASSESSMENT — ENCOUNTER SYMPTOMS
DYSURIA: 0
FEVER: 0
PHOTOPHOBIA: 1
NUMBNESS: 0
ABDOMINAL PAIN: 0
HEADACHES: 1

## 2021-11-01 ASSESSMENT — MIFFLIN-ST. JEOR: SCORE: 1531.01

## 2021-11-01 NOTE — DISCHARGE INSTRUCTIONS
Please come back to the ER immediately worsening symptoms, any vomiting that you noticed at home, or any other concerns.  We treated you for a migraine today, and you are pain-free when you left, please do follow with your regular doctor by the end of this week as well.  Come back with any other concerns.  Please use Tylenol Motrin over-the-counter to help with any additional pain you may have, and come back to the ER if that is not sufficient to take care of your pain.

## 2021-11-01 NOTE — ED TRIAGE NOTES
Pt c/o headache since yesterday morning. Pt reports most of the pain is on the right side of her head. Pt states it is getting worse, pain is spreading over to left side of head as well. Pt is also very photophobic.

## 2021-11-01 NOTE — ED PROVIDER NOTES
"  History   Chief Complaint:  Headache       HPI   Gutierrez Mar is a 31 year old female who presents with a headache. Per the patient, yesterday at 1100 she developed a left-sided headache with photophobia and left eye pressure which progressively worsened throughout the day. She tried taking Tylenol and ibuprofen, and tried to rest more, but this did not help relieve her pain. She states her pain was at its worst last night. She denies fever, chest pain, abdominal pain, dysuria, numbness, paraesthesias, fever, and rash. She has no history of migraines. She has a history of shingles and states her headache is similar to this, but she has not noted any rashes.     Review of Systems   Constitutional: Negative for fever.   Eyes: Positive for photophobia.   Cardiovascular: Negative for chest pain.   Gastrointestinal: Negative for abdominal pain.   Genitourinary: Negative for dysuria.   Skin: Negative for rash.   Neurological: Positive for headaches. Negative for numbness.        Negative for paresthesias    All other systems reviewed and are negative.        Allergies:  Clindamycin  Cephalexin  Oxycodone    Medications:  Ibuprofen  Tylenol    Past Medical History:     C. Difficile diarrhea  Depressive disorder  Sickle cell trait  Vitamin D deficiency   Metabolic syndrome X    Past Surgical History:    Mammoplasty augmentation     Social History:  The patient was unaccompanied to the ER  The patient works as a travel nurse    Physical Exam     Patient Vitals for the past 24 hrs:   BP Temp Temp src Pulse Resp SpO2 Height Weight   11/01/21 1017 112/78 98.2  F (36.8  C) Temporal 76 20 100 % 1.575 m (5' 2\") 85.3 kg (188 lb)       Physical Exam  Vitals: reviewed by me  General: Pt seen on [facility], cooperative, and alert to conversation  Eyes: Tracking well, clear conjunctiva BL  ENT: MMM, midline trachea. FROM to neck   Lungs: No tachypnea, no accessory muscle use. No respiratory distress.   CV: Rate as " above  MSK: no joint effusion.  No evidence of trauma  Skin: No rash  Neuro: Clear speech and no facial droop.  BUE and BLE with SILT and 5/5 motor  Psych: Not RIS, no e/o AH/VH      Emergency Department Course     Emergency Department Course:  Reviewed:  I reviewed nursing notes, vitals and past medical history    Assessments:  1024 I obtained history and examined the patient as noted above.   1117 I rechecked the patient.    Interventions:  1052: Normal Saline, 1 liter, IV bolus    1054: Tylenol, 1000 mg, Oral  1055: Benadryl, 12.5 mg, IV  1057: Toradol, 15 mg, IV  1059: Reglan, 10 mg, IV  1104: Magnesium sulfate, 2 g, IV    Disposition:  The patient was discharged to home.     Impression & Plan     Medical Decision Making:  This is a pleasant 31-year-old female who presents the emergency room with appears to be a headache, possibly migraine.  It does not thunderclap in origin, and she has a normal neurologic exam here.  Standard migraine cocktail, she feels dramatically improved and I do think that she stable for discharge home.  Her vital signs are within normal limits, and I see no evidence of meningitis or encephalitis.  She is mentating appropriately.  We will plan for discharge home with return ED precautions primary care follow-up.  She feels comfortable this plan and all of her questions were answered.  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    Diagnosis:    ICD-10-CM    1. Nonintractable headache, unspecified chronicity pattern, unspecified headache type  R51.9        Scribe Disclosure:  I, Guido Lebron, am serving as a scribe at 10:27 AM on 11/1/2021 to document services personally performed by Oswald Urias MD based on my observations and the provider's statements to me.        Oswald Urias MD  11/01/21 0556

## 2022-01-15 ENCOUNTER — HEALTH MAINTENANCE LETTER (OUTPATIENT)
Age: 32
End: 2022-01-15

## 2022-07-26 ENCOUNTER — HOSPITAL ENCOUNTER (EMERGENCY)
Facility: CLINIC | Age: 32
Discharge: LEFT BEFORE DISCHARGE | End: 2022-07-26

## 2022-07-26 VITALS
TEMPERATURE: 98.7 F | OXYGEN SATURATION: 100 % | SYSTOLIC BLOOD PRESSURE: 129 MMHG | HEART RATE: 69 BPM | DIASTOLIC BLOOD PRESSURE: 80 MMHG | RESPIRATION RATE: 20 BRPM

## 2023-01-07 ENCOUNTER — HEALTH MAINTENANCE LETTER (OUTPATIENT)
Age: 33
End: 2023-01-07

## 2023-04-22 ENCOUNTER — HEALTH MAINTENANCE LETTER (OUTPATIENT)
Age: 33
End: 2023-04-22

## 2024-06-29 ENCOUNTER — HEALTH MAINTENANCE LETTER (OUTPATIENT)
Age: 34
End: 2024-06-29

## 2025-07-03 NOTE — LETTER
[facility]  [address] [address]  [phone]      November 29[birth year] 2019    RE:  Gutierrez Mar                                                                                                                                                       [address]   [place] [facility] [address]            To whom it may concern:    Gutierrez Mar is under my professional care for Medical.   She  may return to work with the following: No working or lifting restrictions on or about Monday.          Sincerely,        Stevan Purvis, DO    [facility]         Propranolol Pregnancy And Lactation Text: This medication is Pregnancy Category C and it isn't known if it is safe during pregnancy. It is excreted in breast milk.